# Patient Record
Sex: MALE | Race: OTHER | HISPANIC OR LATINO | URBAN - METROPOLITAN AREA
[De-identification: names, ages, dates, MRNs, and addresses within clinical notes are randomized per-mention and may not be internally consistent; named-entity substitution may affect disease eponyms.]

---

## 2018-10-04 ENCOUNTER — OUTPATIENT (OUTPATIENT)
Dept: OUTPATIENT SERVICES | Facility: HOSPITAL | Age: 73
LOS: 1 days | Discharge: HOME | End: 2018-10-04
Payer: MEDICARE

## 2018-10-04 LAB
BASE EXCESS BLDV CALC-SCNC: 3.7 MMOL/L — SIGNIFICANT CHANGE UP
CA-I SERPL-SCNC: 1.04 MMOL/L — LOW (ref 1.12–1.3)
GAS PNL BLDV: 138 MMOL/L — SIGNIFICANT CHANGE UP (ref 138–146)
GAS PNL BLDV: SIGNIFICANT CHANGE UP
GAS PNL BLDV: SIGNIFICANT CHANGE UP
GLUCOSE BLDC GLUCOMTR-MCNC: 92 MG/DL — SIGNIFICANT CHANGE UP (ref 70–99)
HCO3 BLDV-SCNC: 28 MMOL/L — HIGH (ref 20–27)
PCO2 BLDV: 43 MMHG — SIGNIFICANT CHANGE UP (ref 41–51)
PH BLDV: 7.44 — HIGH (ref 7.32–7.43)
PO2 BLDV: 48 MMHG — SIGNIFICANT CHANGE UP
POTASSIUM BLDV-SCNC: 5.2 MMOL/L — HIGH (ref 3.5–4.9)
SAO2 % BLDV: 82 % — SIGNIFICANT CHANGE UP

## 2018-10-04 PROCEDURE — 45385 COLONOSCOPY W/LESION REMOVAL: CPT

## 2018-10-04 PROCEDURE — 82803 BLOOD GASES ANY COMBINATION: CPT

## 2018-10-04 PROCEDURE — 82330 ASSAY OF CALCIUM: CPT

## 2018-10-04 PROCEDURE — 88305 TISSUE EXAM BY PATHOLOGIST: CPT

## 2018-10-04 PROCEDURE — 82962 GLUCOSE BLOOD TEST: CPT

## 2018-10-04 PROCEDURE — 84295 ASSAY OF SERUM SODIUM: CPT

## 2018-10-04 PROCEDURE — 84132 ASSAY OF SERUM POTASSIUM: CPT

## 2018-10-04 PROCEDURE — 45384 COLONOSCOPY W/LESION REMOVAL: CPT | Mod: XS

## 2018-10-04 RX ORDER — SODIUM POLYSTYRENE SULFONATE 4.1 MEQ/G
30 POWDER, FOR SUSPENSION ORAL ONCE
Qty: 0 | Refills: 0 | Status: DISCONTINUED | OUTPATIENT
Start: 2018-10-04 | End: 2018-10-05

## 2018-10-05 LAB — SURGICAL PATHOLOGY STUDY: SIGNIFICANT CHANGE UP

## 2019-08-19 ENCOUNTER — INPATIENT (INPATIENT)
Facility: HOSPITAL | Age: 74
LOS: 1 days | Discharge: HOME | DRG: 919 | End: 2019-08-21
Attending: SURGERY | Admitting: SURGERY
Payer: MEDICARE

## 2019-08-19 VITALS
DIASTOLIC BLOOD PRESSURE: 54 MMHG | WEIGHT: 160.06 LBS | SYSTOLIC BLOOD PRESSURE: 111 MMHG | HEIGHT: 67 IN | RESPIRATION RATE: 18 BRPM | OXYGEN SATURATION: 100 % | HEART RATE: 60 BPM

## 2019-08-19 DIAGNOSIS — T14.8XXA OTHER INJURY OF UNSPECIFIED BODY REGION, INITIAL ENCOUNTER: ICD-10-CM

## 2019-08-19 LAB
ALBUMIN SERPL ELPH-MCNC: 3.5 G/DL — SIGNIFICANT CHANGE UP (ref 3.3–5)
ALP SERPL-CCNC: 43 U/L — SIGNIFICANT CHANGE UP (ref 40–120)
ALT FLD-CCNC: 14 U/L — SIGNIFICANT CHANGE UP (ref 10–45)
ANION GAP SERPL CALC-SCNC: 17 MMOL/L — SIGNIFICANT CHANGE UP (ref 5–17)
APTT BLD: 30.3 SEC — SIGNIFICANT CHANGE UP (ref 27.5–36.3)
AST SERPL-CCNC: 16 U/L — SIGNIFICANT CHANGE UP (ref 10–40)
BASE EXCESS BLDV CALC-SCNC: 2.6 MMOL/L — HIGH (ref -2–2)
BILIRUB SERPL-MCNC: 0.3 MG/DL — SIGNIFICANT CHANGE UP (ref 0.2–1.2)
BLD GP AB SCN SERPL QL: NEGATIVE — SIGNIFICANT CHANGE UP
BUN SERPL-MCNC: 42 MG/DL — HIGH (ref 7–23)
CA-I SERPL-SCNC: 1.11 MMOL/L — LOW (ref 1.12–1.3)
CALCIUM SERPL-MCNC: 9.4 MG/DL — SIGNIFICANT CHANGE UP (ref 8.4–10.5)
CHLORIDE BLDV-SCNC: 106 MMOL/L — SIGNIFICANT CHANGE UP (ref 96–108)
CHLORIDE SERPL-SCNC: 102 MMOL/L — SIGNIFICANT CHANGE UP (ref 96–108)
CO2 BLDV-SCNC: 27 MMOL/L — SIGNIFICANT CHANGE UP (ref 22–30)
CO2 SERPL-SCNC: 23 MMOL/L — SIGNIFICANT CHANGE UP (ref 22–31)
CREAT SERPL-MCNC: 10.49 MG/DL — HIGH (ref 0.5–1.3)
GAS PNL BLDV: 139 MMOL/L — SIGNIFICANT CHANGE UP (ref 135–145)
GAS PNL BLDV: SIGNIFICANT CHANGE UP
GAS PNL BLDV: SIGNIFICANT CHANGE UP
GLUCOSE BLDV-MCNC: 139 MG/DL — HIGH (ref 70–99)
GLUCOSE SERPL-MCNC: 145 MG/DL — HIGH (ref 70–99)
HCO3 BLDV-SCNC: 26 MMOL/L — SIGNIFICANT CHANGE UP (ref 21–29)
HCT VFR BLD CALC: 24.4 % — LOW (ref 39–50)
HCT VFR BLDA CALC: 24 % — LOW (ref 39–50)
HGB BLD CALC-MCNC: 7.9 G/DL — LOW (ref 13–17)
HGB BLD-MCNC: 8.2 G/DL — LOW (ref 13–17)
INR BLD: 1.28 RATIO — HIGH (ref 0.88–1.16)
LACTATE BLDV-MCNC: 2.1 MMOL/L — HIGH (ref 0.7–2)
MCHC RBC-ENTMCNC: 32.4 PG — SIGNIFICANT CHANGE UP (ref 27–34)
MCHC RBC-ENTMCNC: 33.4 GM/DL — SIGNIFICANT CHANGE UP (ref 32–36)
MCV RBC AUTO: 97 FL — SIGNIFICANT CHANGE UP (ref 80–100)
PCO2 BLDV: 37 MMHG — SIGNIFICANT CHANGE UP (ref 35–50)
PH BLDV: 7.46 — HIGH (ref 7.35–7.45)
PLATELET # BLD AUTO: 144 K/UL — LOW (ref 150–400)
PO2 BLDV: 43 MMHG — SIGNIFICANT CHANGE UP (ref 25–45)
POTASSIUM BLDV-SCNC: 6.2 MMOL/L — CRITICAL HIGH (ref 3.5–5.3)
POTASSIUM SERPL-MCNC: 5.1 MMOL/L — SIGNIFICANT CHANGE UP (ref 3.5–5.3)
POTASSIUM SERPL-SCNC: 5.1 MMOL/L — SIGNIFICANT CHANGE UP (ref 3.5–5.3)
PROT SERPL-MCNC: 5.9 G/DL — LOW (ref 6–8.3)
PROTHROM AB SERPL-ACNC: 14.7 SEC — HIGH (ref 10–12.9)
RBC # BLD: 2.52 M/UL — LOW (ref 4.2–5.8)
RBC # FLD: 13.8 % — SIGNIFICANT CHANGE UP (ref 10.3–14.5)
RH IG SCN BLD-IMP: POSITIVE — SIGNIFICANT CHANGE UP
SAO2 % BLDV: 80 % — SIGNIFICANT CHANGE UP (ref 67–88)
SODIUM SERPL-SCNC: 142 MMOL/L — SIGNIFICANT CHANGE UP (ref 135–145)
WBC # BLD: 7.7 K/UL — SIGNIFICANT CHANGE UP (ref 3.8–10.5)
WBC # FLD AUTO: 7.7 K/UL — SIGNIFICANT CHANGE UP (ref 3.8–10.5)

## 2019-08-19 RX ORDER — DEXTROSE 50 % IN WATER 50 %
25 SYRINGE (ML) INTRAVENOUS ONCE
Refills: 0 | Status: DISCONTINUED | OUTPATIENT
Start: 2019-08-19 | End: 2019-08-20

## 2019-08-19 RX ORDER — INSULIN LISPRO 100/ML
VIAL (ML) SUBCUTANEOUS EVERY 6 HOURS
Refills: 0 | Status: DISCONTINUED | OUTPATIENT
Start: 2019-08-19 | End: 2019-08-20

## 2019-08-19 RX ORDER — INSULIN LISPRO 100/ML
VIAL (ML) SUBCUTANEOUS
Refills: 0 | Status: DISCONTINUED | OUTPATIENT
Start: 2019-08-19 | End: 2019-08-19

## 2019-08-19 RX ORDER — SODIUM CHLORIDE 9 MG/ML
1000 INJECTION, SOLUTION INTRAVENOUS
Refills: 0 | Status: DISCONTINUED | OUTPATIENT
Start: 2019-08-19 | End: 2019-08-21

## 2019-08-19 RX ORDER — DEXTROSE 50 % IN WATER 50 %
12.5 SYRINGE (ML) INTRAVENOUS ONCE
Refills: 0 | Status: DISCONTINUED | OUTPATIENT
Start: 2019-08-19 | End: 2019-08-21

## 2019-08-19 RX ORDER — INSULIN LISPRO 100/ML
VIAL (ML) SUBCUTANEOUS AT BEDTIME
Refills: 0 | Status: DISCONTINUED | OUTPATIENT
Start: 2019-08-19 | End: 2019-08-21

## 2019-08-19 RX ORDER — ASPIRIN/CALCIUM CARB/MAGNESIUM 324 MG
81 TABLET ORAL DAILY
Refills: 0 | Status: DISCONTINUED | OUTPATIENT
Start: 2019-08-19 | End: 2019-08-20

## 2019-08-19 RX ORDER — HEPARIN SODIUM 5000 [USP'U]/ML
5000 INJECTION INTRAVENOUS; SUBCUTANEOUS EVERY 8 HOURS
Refills: 0 | Status: DISCONTINUED | OUTPATIENT
Start: 2019-08-19 | End: 2019-08-20

## 2019-08-19 RX ORDER — GLUCAGON INJECTION, SOLUTION 0.5 MG/.1ML
1 INJECTION, SOLUTION SUBCUTANEOUS ONCE
Refills: 0 | Status: DISCONTINUED | OUTPATIENT
Start: 2019-08-19 | End: 2019-08-21

## 2019-08-19 RX ORDER — SODIUM CHLORIDE 9 MG/ML
1000 INJECTION INTRAMUSCULAR; INTRAVENOUS; SUBCUTANEOUS
Refills: 0 | Status: DISCONTINUED | OUTPATIENT
Start: 2019-08-19 | End: 2019-08-20

## 2019-08-19 RX ORDER — DEXTROSE 50 % IN WATER 50 %
25 SYRINGE (ML) INTRAVENOUS ONCE
Refills: 0 | Status: DISCONTINUED | OUTPATIENT
Start: 2019-08-19 | End: 2019-08-21

## 2019-08-19 RX ORDER — DEXTROSE 50 % IN WATER 50 %
15 SYRINGE (ML) INTRAVENOUS ONCE
Refills: 0 | Status: DISCONTINUED | OUTPATIENT
Start: 2019-08-19 | End: 2019-08-21

## 2019-08-19 NOTE — ED PROVIDER NOTE - CLINICAL SUMMARY MEDICAL DECISION MAKING FREE TEXT BOX
74 yo m pw left groin swelling s/p IR artherectomy. pt VSS. vasc consult. imaging. reassess. 72 yo m pw left groin swelling s/p IR artherectomy. pt VSS. vasc consult. imaging. reassess.  Attending Sanjuanitamariza Camara: 74 y/o male s/p recent vascular procedure presenting with increased hematoma and swelling to scrotum. upon arrival vascular surgery consulted and pressure being applied to femoral artery. not reportedly on blood thinners. d/w vascular who recommends CTA and continued pressure to evaluate for active extravasation. tba

## 2019-08-19 NOTE — H&P ADULT - ASSESSMENT
73M with PMH of CAD with stents (2013, on ASA, plavix), DM, HLD, ESRD on HD now with groin hematoma s/p diagnostic angiogram at outside facility.  CTA performed, no active contrast extravasation or retroperitoneal hematoma.    -Admit to vascular surgery (Rayna)  - NPO, NS @ 30 cc/hr  - Femstop device applied at 65mmHg- will remove after 2 hours  - Trend serial CBC q 6 hours, serial groin exam   - Continue ASA, hold plavix  - ISS for DM  - HD tomorrow, will consult renal in AM    D/W Dr. Martinez, vascular fellow

## 2019-08-19 NOTE — ED ADULT NURSE NOTE - NSIMPLEMENTINTERV_GEN_ALL_ED
Implemented All Fall with Harm Risk Interventions:  [place] to call system. Call bell, personal items and telephone within reach. Instruct patient to call for assistance. Room bathroom lighting operational. Non-slip footwear when patient is off stretcher. Physically safe environment: no spills, clutter or unnecessary equipment. Stretcher in lowest position, wheels locked, appropriate side rails in place. Provide visual cue, wrist band, yellow gown, etc. Monitor gait and stability. Monitor for mental status changes and reorient to person, place, and time. Review medications for side effects contributing to fall risk. Reinforce activity limits and safety measures with patient and family. Provide visual clues: red socks.

## 2019-08-19 NOTE — ED PROVIDER NOTE - OBJECTIVE STATEMENT
73M with PMH of CAD s/p stents on ASA, plavix, ESRD on HD tu/thurs/sat via LUE AVF, DM, HLD presents after outpatient IR angiogram with left groin hematoma.   Immediately post procedure the patient developed a large left groin hematoma over the access site that tracked into his scrotum.  Pressure was applied and it appeared to stop however four hours later the patient developed an expanding hematoma. Pt reports pain to area of site, mild, dull, 3/10. denies n/v, f/c, cp, sob.

## 2019-08-19 NOTE — ED PROVIDER NOTE - NS ED ROS FT
GENERAL: No fever or chills, //             EYES: no change in vision, //             HEENT: no trouble swallowing or speaking, //             CARDIAC: no chest pain, //              PULMONARY: no cough or SOB, //             GI: no abdominal pain, no nausea or no vomiting, no diarrhea or constipation, //             : No changes in urination,  //            SKIN: no rashes,  //            NEURO: no headache,  //             MSK: r groin pain. ~Reginaldo Anguiano DO PGY1

## 2019-08-19 NOTE — ED ADULT NURSE NOTE - EXTENSIONS OF SELF_ADULT
Returned patient's phone call, he states he is in the process of moving to [place] and needed a 90 script for Pantoprazole.  He plans to find a GI MD once he moves there. Advised will e-scribe script to his mail order pharmacy. He verb understanding.    None

## 2019-08-19 NOTE — ED PROVIDER NOTE - PHYSICAL EXAMINATION
General: well appearing, interactive, well nourished, no apparent distress  HEENT: EOMI, PERRLA, normal mucosa, normal oropharynx, no lesions on the lips or on oral mucosa, normal external ear  Neck: supple, no lymphadenopathy, full range of motion, no nuchal rigidity  CV: RRR, normal S1 and S2 with no murmur, capillary refill less than two seconds, pp 2+  Resp: lungs CTA b/l, good aeration bilaterally, symmetric chest wall   Abd: non-distended, soft, non-tender  : no CVA tenderness  MSK: full range of motion, left groin swelling extending into scrotum, no evidence of paraphimosis ( chaperone: trino muñoz md)  Neuro: cranial nerves intact, muscle strength 5/5 in all extremities, normal gait  Skin: no rashes, skin intact General: well appearing, interactive, well nourished, no apparent distress  HEENT: EOMI, PERRLA, normal mucosa, normal oropharynx, no lesions on the lips or on oral mucosa, normal external ear  Neck: supple, no lymphadenopathy, full range of motion, no nuchal rigidity  CV: RRR, normal S1 and S2 with no murmur, capillary refill less than two seconds, pp 2+  Resp: lungs CTA b/l, good aeration bilaterally, symmetric chest wall   Abd: non-distended, soft, non-tender  : no CVA tenderness  MSK: full range of motion, left groin swelling extending into scrotum, no evidence of paraphimosis ( chaperone: sanjuanita camara md)  Neuro: cranial nerves intact, muscle strength 5/5 in all extremities, normal gait  Skin: no rashes, skin intact  Attending Sanjuanita Camara: Gen: NAD, heent: atrauamtic, eomi, perrla, mmm, op pink, uvula midline, neck; nttp, no nuchal rigidity, chest: nttp, no crepitus, cv: rrr, , lungs: ctab, abd: soft, nontender, nondistended, no peritoneal signs, , ext: wwp, neg homans, : diffuse scrotal swelling, and mild ttp, skin: no rash, neuro: awake and alert, following commands, speech clear, sensation and strength intact, no focal deficits

## 2019-08-19 NOTE — H&P ADULT - NSICDXPASTMEDICALHX_GEN_ALL_CORE_FT
PAST MEDICAL HISTORY:  CAD (coronary artery disease)     Diabetes mellitus     End-stage renal disease     Hyperlipidemia cad

## 2019-08-19 NOTE — ED PROVIDER NOTE - ATTENDING CONTRIBUTION TO CARE
Attending MD Sanjuanita Camara:  I personally have seen and examined this patient.  Resident note reviewed and agree on plan of care and except where noted.  See HPI, PE, and MDM for details.

## 2019-08-19 NOTE — ED ADULT NURSE NOTE - NSFALLRSKHRMRISKTYPE_ED_ALL_ED
surgery(72hr postop, recent leg amputee, sig. abd/thor surg)/coagulation(Bleeding disorder R/T clinical cond/anti-coags)

## 2019-08-19 NOTE — H&P ADULT - HISTORY OF PRESENT ILLNESS
Vascular Surgery  Attending: Rayna  Service: Vascular   Contact: [phone]    HPI  73M with PMH of CAD s/p stents on ASA, plavix, ESRD on HD tu/thurs/sat via LUE AVF, DM, HLD presents after outpatient IR angiogram with left groin hematoma.  Patient was undergoing a diagnostic angiogram at [facility] with Dr. Watson.  A 7Fr sheath was placed in the L femoral artery.  A closure device was not used as patient was hypertensive at the time.  Immediately post procedure the patient developed a large left groin hematoma over the access site that tracked into his scrotum.  Pressure was applied and it appeared to stop however four hours later the patient developed an expanding hematoma.  He was emergently brought to [facility] for vascular surgery evaluation.      In the ER he was hemodyanmically stable with a HR in the 70s-80s, -160, saturating well on RA.  On exam he had a large L groin hematoma with firm pubis and swollen scrotum.  He had palpable femoral pulses, dopplerable PT bilaterally and nonpalpable bilateral DP.  Dopplerable R AT present.  Hct 24 , lactate 2.1.  CTA of abdomen and pelvis revealed no active extravasation or retroperitoneal hematoma.      PMH/PSH  End-stage renal disease  Diabetes mellitus  CAD (coronary artery disease)  Hyperlipidemia          Home Medications:  aspirin 81 mg oral tablet, chewable: 1 tab(s) orally once a day (19 Aug 2019 21:53)  glipiZIDE 10 mg oral tablet: 1 tab(s) orally once a day (19 Aug 2019 21:53)  Plavix 75 mg oral tablet: 1 tab(s) orally once a day (19 Aug 2019 21:53)      Allergies    No Known Allergies    Intolerances        Social: lives at home with wife     Physical Exam  T(C): 36.9 (08-19-19 @ 20:16), Max: 36.9 (08-19-19 @ 20:16)  HR: 59 (08-19-19 @ 21:42) (59 - 64)  BP: 131/68 (08-19-19 @ 21:42) (111/54 - 158/61)  RR: 16 (08-19-19 @ 21:42) (16 - 18)  SpO2: 99% (08-19-19 @ 21:42) (97% - 100%)  Wt(kg): --  Tmax: T(C): , Max: 36.9 (08-19-19 @ 20:16)  Wt(kg): --      Gen: NAD  Neuro: AAOx3  HEENT: normocephalic, atraumatic, no scleral icterus  CV: S1, S2, RRR  Pulm: CTA B/L  Abd: firm over pubis with swollen scrotum, soft anterior thigh compartment, abdomen soft, non tender.   Ext: warm, no edema, palpable femoral pulses, dopplerable PT bilaterally and nonpalpable bilateral DP.  Dopplerable R AT present    LABS                        8.2    7.7   )-----------( 144      ( 19 Aug 2019 19:52 )             24.4     08-19    142  |  102  |  42<H>  ----------------------------<  145<H>  5.1   |  23  |  10.49<H>    Ca    9.4      19 Aug 2019 19:52    TPro  5.9<L>  /  Alb  3.5  /  TBili  0.3  /  DBili  x   /  AST  16  /  ALT  14  /  AlkPhos  43  08-19    PT/INR - ( 19 Aug 2019 20:03 )   PT: 14.7 sec;   INR: 1.28 ratio         PTT - ( 19 Aug 2019 20:03 )  PTT:30.3 sec      CTA reviewed with radiology.

## 2019-08-19 NOTE — ED PROVIDER NOTE - PROGRESS NOTE DETAILS
Attending Sanjuanita Camara: d/w vascular surgery upon arrival request cta of abdomen and ext Attending Sanjuanita Camara: d/w vascular will admit Attending Sanjuanita Camara: d/w vascular will admit, fem stop pressure applied

## 2019-08-19 NOTE — ED ADULT NURSE NOTE - OBJECTIVE STATEMENT
bleeding from groin  sp procedure  coming from md office  on blood thinners 74 yo m brought in by EMS for "bleeding from groin s/p procedure". PMH of ESRD (on dialysis w/ fistula to Left UE (pink "do not use" band applied to LUE), DM, CAD, & HLD. Pt had atherectomy today in MD office and developed bleeding from left groin and swelling in the left testicle. Pressure applied en route to hospital. No bleeding noted from groin at this time.      s/p Atherectomy. bleeding from groin  sp procedure  coming from md office  on blood thinners 74 yo m brought in by EMS for "bleeding from groin s/p procedure". PMH of ESRD (on dialysis w/ fistula to Left UE (pink "do not use" band applied to LUE), DM, CAD (s/p stents on ASA & Plavix), & HLD. Pt had IR angiogram procedure done today in MD office with access done in Left groin. Immediately post-procedure, pt developed large hematoma to left groin and swelling in the left testicle. Pressure was applied by staff at [facility] and an expanding hematoma to left groin was noted 4 hours post-procedure. Pt sent here for vascular surgery evaluation. Pressure applied en route to hospital by [facility] Staff. No bleeding noted from groin at this time. Pt AAOx3, NAD, Respirations spontaneous, non-labored. Lungs clear bilaterally. Abdomen soft nontender nondistended. Full range of motion of all extremities. Bilateral radial pulses noted. Bilateral femoral pulses present. Non-palpable dorsalis pedis pulses (pt reports to be baseline). Large hematoma noted to left groin & swelling noted to the scrotum. Pt denies headache, dizziness, chest pain, palpitations, cough, SOB, abdominal pain, n/v/d, numbness/tingling, fevers, chills, weakness at this time. VSS. 22 gauge in RAC placed prior to arrival. 18 gauge established in the and. Bed locked & in lowest position. Safety maintained. Will continue to reassess.

## 2019-08-19 NOTE — ED ADULT TRIAGE NOTE - CHIEF COMPLAINT QUOTE
bleeding from groin  sp procedure  coming from md office bleeding from groin  sp procedure  coming from md office  on blood thinners

## 2019-08-20 DIAGNOSIS — I10 ESSENTIAL (PRIMARY) HYPERTENSION: ICD-10-CM

## 2019-08-20 DIAGNOSIS — N18.6 END STAGE RENAL DISEASE: ICD-10-CM

## 2019-08-20 DIAGNOSIS — D64.9 ANEMIA, UNSPECIFIED: ICD-10-CM

## 2019-08-20 LAB
BLD GP AB SCN SERPL QL: NEGATIVE — SIGNIFICANT CHANGE UP
GLUCOSE BLDC GLUCOMTR-MCNC: 123 MG/DL — HIGH (ref 70–99)
GLUCOSE BLDC GLUCOMTR-MCNC: 133 MG/DL — HIGH (ref 70–99)
GLUCOSE BLDC GLUCOMTR-MCNC: 142 MG/DL — HIGH (ref 70–99)
GLUCOSE BLDC GLUCOMTR-MCNC: 146 MG/DL — HIGH (ref 70–99)
GLUCOSE BLDC GLUCOMTR-MCNC: 156 MG/DL — HIGH (ref 70–99)
GLUCOSE BLDC GLUCOMTR-MCNC: 67 MG/DL — LOW (ref 70–99)
GLUCOSE BLDC GLUCOMTR-MCNC: 68 MG/DL — LOW (ref 70–99)
GLUCOSE BLDC GLUCOMTR-MCNC: 91 MG/DL — SIGNIFICANT CHANGE UP (ref 70–99)
HBA1C BLD-MCNC: 5 % — SIGNIFICANT CHANGE UP (ref 4–5.6)
HCT VFR BLD CALC: 20.8 % — CRITICAL LOW (ref 39–50)
HCT VFR BLD CALC: 24.3 % — LOW (ref 39–50)
HCT VFR BLD CALC: 27.5 % — LOW (ref 39–50)
HCV AB S/CO SERPL IA: 0.09 S/CO — SIGNIFICANT CHANGE UP (ref 0–0.99)
HCV AB SERPL-IMP: SIGNIFICANT CHANGE UP
HGB BLD-MCNC: 6.8 G/DL — CRITICAL LOW (ref 13–17)
HGB BLD-MCNC: 8 G/DL — LOW (ref 13–17)
HGB BLD-MCNC: 9.6 G/DL — LOW (ref 13–17)
LACTATE SERPL-SCNC: 1.1 MMOL/L — SIGNIFICANT CHANGE UP (ref 0.7–2)
MCHC RBC-ENTMCNC: 32.2 PG — SIGNIFICANT CHANGE UP (ref 27–34)
MCHC RBC-ENTMCNC: 32.2 PG — SIGNIFICANT CHANGE UP (ref 27–34)
MCHC RBC-ENTMCNC: 32.6 GM/DL — SIGNIFICANT CHANGE UP (ref 32–36)
MCHC RBC-ENTMCNC: 32.6 PG — SIGNIFICANT CHANGE UP (ref 27–34)
MCHC RBC-ENTMCNC: 32.9 GM/DL — SIGNIFICANT CHANGE UP (ref 32–36)
MCHC RBC-ENTMCNC: 34.8 GM/DL — SIGNIFICANT CHANGE UP (ref 32–36)
MCV RBC AUTO: 93.7 FL — SIGNIFICANT CHANGE UP (ref 80–100)
MCV RBC AUTO: 98 FL — SIGNIFICANT CHANGE UP (ref 80–100)
MCV RBC AUTO: 98.6 FL — SIGNIFICANT CHANGE UP (ref 80–100)
PLATELET # BLD AUTO: 109 K/UL — LOW (ref 150–400)
PLATELET # BLD AUTO: 121 K/UL — LOW (ref 150–400)
PLATELET # BLD AUTO: 126 K/UL — LOW (ref 150–400)
RBC # BLD: 2.11 M/UL — LOW (ref 4.2–5.8)
RBC # BLD: 2.48 M/UL — LOW (ref 4.2–5.8)
RBC # BLD: 2.94 M/UL — LOW (ref 4.2–5.8)
RBC # FLD: 13.7 % — SIGNIFICANT CHANGE UP (ref 10.3–14.5)
RBC # FLD: 13.7 % — SIGNIFICANT CHANGE UP (ref 10.3–14.5)
RBC # FLD: 14.4 % — SIGNIFICANT CHANGE UP (ref 10.3–14.5)
RH IG SCN BLD-IMP: POSITIVE — SIGNIFICANT CHANGE UP
WBC # BLD: 5.2 K/UL — SIGNIFICANT CHANGE UP (ref 3.8–10.5)
WBC # BLD: 5.9 K/UL — SIGNIFICANT CHANGE UP (ref 3.8–10.5)
WBC # BLD: 7.1 K/UL — SIGNIFICANT CHANGE UP (ref 3.8–10.5)
WBC # FLD AUTO: 5.2 K/UL — SIGNIFICANT CHANGE UP (ref 3.8–10.5)
WBC # FLD AUTO: 5.9 K/UL — SIGNIFICANT CHANGE UP (ref 3.8–10.5)
WBC # FLD AUTO: 7.1 K/UL — SIGNIFICANT CHANGE UP (ref 3.8–10.5)

## 2019-08-20 RX ORDER — CLOPIDOGREL BISULFATE 75 MG/1
1 TABLET, FILM COATED ORAL
Qty: 0 | Refills: 0 | DISCHARGE

## 2019-08-20 RX ORDER — HEPARIN SODIUM 5000 [USP'U]/ML
5000 INJECTION INTRAVENOUS; SUBCUTANEOUS EVERY 8 HOURS
Refills: 0 | Status: DISCONTINUED | OUTPATIENT
Start: 2019-08-20 | End: 2019-08-21

## 2019-08-20 RX ORDER — ATORVASTATIN CALCIUM 80 MG/1
80 TABLET, FILM COATED ORAL AT BEDTIME
Refills: 0 | Status: DISCONTINUED | OUTPATIENT
Start: 2019-08-20 | End: 2019-08-21

## 2019-08-20 RX ORDER — ASPIRIN/CALCIUM CARB/MAGNESIUM 324 MG
81 TABLET ORAL DAILY
Refills: 0 | Status: DISCONTINUED | OUTPATIENT
Start: 2019-08-20 | End: 2019-08-21

## 2019-08-20 RX ORDER — ROPINIROLE 8 MG/1
0.25 TABLET, FILM COATED, EXTENDED RELEASE ORAL AT BEDTIME
Refills: 0 | Status: DISCONTINUED | OUTPATIENT
Start: 2019-08-20 | End: 2019-08-20

## 2019-08-20 RX ORDER — ATORVASTATIN CALCIUM 80 MG/1
1 TABLET, FILM COATED ORAL
Qty: 0 | Refills: 0 | DISCHARGE

## 2019-08-20 RX ORDER — ACETAMINOPHEN 500 MG
1000 TABLET ORAL ONCE
Refills: 0 | Status: COMPLETED | OUTPATIENT
Start: 2019-08-20 | End: 2019-08-20

## 2019-08-20 RX ORDER — HYDRALAZINE HCL 50 MG
25 TABLET ORAL THREE TIMES A DAY
Refills: 0 | Status: DISCONTINUED | OUTPATIENT
Start: 2019-08-20 | End: 2019-08-20

## 2019-08-20 RX ORDER — CHLORHEXIDINE GLUCONATE 213 G/1000ML
1 SOLUTION TOPICAL DAILY
Refills: 0 | Status: DISCONTINUED | OUTPATIENT
Start: 2019-08-20 | End: 2019-08-21

## 2019-08-20 RX ORDER — TAMSULOSIN HYDROCHLORIDE 0.4 MG/1
1 CAPSULE ORAL
Qty: 0 | Refills: 0 | DISCHARGE

## 2019-08-20 RX ORDER — DEXTROSE 50 % IN WATER 50 %
12.5 SYRINGE (ML) INTRAVENOUS ONCE
Refills: 0 | Status: COMPLETED | OUTPATIENT
Start: 2019-08-20 | End: 2019-08-20

## 2019-08-20 RX ORDER — TAMSULOSIN HYDROCHLORIDE 0.4 MG/1
0.4 CAPSULE ORAL AT BEDTIME
Refills: 0 | Status: DISCONTINUED | OUTPATIENT
Start: 2019-08-20 | End: 2019-08-21

## 2019-08-20 RX ORDER — HYDRALAZINE HCL 50 MG
10 TABLET ORAL ONCE
Refills: 0 | Status: COMPLETED | OUTPATIENT
Start: 2019-08-20 | End: 2019-08-20

## 2019-08-20 RX ORDER — INSULIN LISPRO 100/ML
VIAL (ML) SUBCUTANEOUS
Refills: 0 | Status: DISCONTINUED | OUTPATIENT
Start: 2019-08-20 | End: 2019-08-21

## 2019-08-20 RX ORDER — ERYTHROPOIETIN 10000 [IU]/ML
8000 INJECTION, SOLUTION INTRAVENOUS; SUBCUTANEOUS
Refills: 0 | Status: DISCONTINUED | OUTPATIENT
Start: 2019-08-20 | End: 2019-08-21

## 2019-08-20 RX ORDER — HYDRALAZINE HCL 50 MG
25 TABLET ORAL THREE TIMES A DAY
Refills: 0 | Status: DISCONTINUED | OUTPATIENT
Start: 2019-08-20 | End: 2019-08-21

## 2019-08-20 RX ORDER — NIFEDIPINE 30 MG
60 TABLET, EXTENDED RELEASE 24 HR ORAL DAILY
Refills: 0 | Status: DISCONTINUED | OUTPATIENT
Start: 2019-08-20 | End: 2019-08-21

## 2019-08-20 RX ORDER — NIFEDIPINE 30 MG
60 TABLET, EXTENDED RELEASE 24 HR ORAL DAILY
Refills: 0 | Status: DISCONTINUED | OUTPATIENT
Start: 2019-08-20 | End: 2019-08-20

## 2019-08-20 RX ADMIN — Medication 25 MILLIGRAM(S): at 15:16

## 2019-08-20 RX ADMIN — ATORVASTATIN CALCIUM 80 MILLIGRAM(S): 80 TABLET, FILM COATED ORAL at 21:51

## 2019-08-20 RX ADMIN — CHLORHEXIDINE GLUCONATE 1 APPLICATION(S): 213 SOLUTION TOPICAL at 17:00

## 2019-08-20 RX ADMIN — Medication 400 MILLIGRAM(S): at 01:19

## 2019-08-20 RX ADMIN — Medication 10 MILLIGRAM(S): at 16:46

## 2019-08-20 RX ADMIN — ERYTHROPOIETIN 8000 UNIT(S): 10000 INJECTION, SOLUTION INTRAVENOUS; SUBCUTANEOUS at 12:53

## 2019-08-20 RX ADMIN — Medication 25 MILLIGRAM(S): at 21:51

## 2019-08-20 RX ADMIN — HEPARIN SODIUM 5000 UNIT(S): 5000 INJECTION INTRAVENOUS; SUBCUTANEOUS at 15:16

## 2019-08-20 RX ADMIN — HEPARIN SODIUM 5000 UNIT(S): 5000 INJECTION INTRAVENOUS; SUBCUTANEOUS at 06:02

## 2019-08-20 RX ADMIN — Medication 12.5 GRAM(S): at 05:59

## 2019-08-20 RX ADMIN — Medication 1: at 09:01

## 2019-08-20 RX ADMIN — HEPARIN SODIUM 5000 UNIT(S): 5000 INJECTION INTRAVENOUS; SUBCUTANEOUS at 21:51

## 2019-08-20 RX ADMIN — TAMSULOSIN HYDROCHLORIDE 0.4 MILLIGRAM(S): 0.4 CAPSULE ORAL at 21:51

## 2019-08-20 RX ADMIN — Medication 81 MILLIGRAM(S): at 15:16

## 2019-08-20 RX ADMIN — Medication 1000 MILLIGRAM(S): at 01:35

## 2019-08-20 NOTE — PROGRESS NOTE ADULT - SUBJECTIVE AND OBJECTIVE BOX
VASCULAR SURGERY DAILY PROGRESS NOTE:       SUBJECTIVE/ROS: Patient feels well- mild groin pain  Denies nausea, vomiting, chest pain, shortness of breath         MEDICATIONS  (STANDING):  aspirin  chewable 81 milliGRAM(s) Oral daily  dextrose 5%. 1000 milliLiter(s) (50 mL/Hr) IV Continuous <Continuous>  dextrose 50% Injectable 12.5 Gram(s) IV Push once  dextrose 50% Injectable 25 Gram(s) IV Push once  dextrose 50% Injectable 25 Gram(s) IV Push once  heparin  Injectable 5000 Unit(s) SubCutaneous every 8 hours  insulin lispro (HumaLOG) corrective regimen sliding scale   SubCutaneous at bedtime  insulin lispro (HumaLOG) corrective regimen sliding scale   SubCutaneous every 6 hours  sodium chloride 0.9%. 1000 milliLiter(s) (30 mL/Hr) IV Continuous <Continuous>    MEDICATIONS  (PRN):  dextrose 40% Gel 15 Gram(s) Oral once PRN Blood Glucose LESS THAN 70 milliGRAM(s)/deciliter  glucagon  Injectable 1 milliGRAM(s) IntraMuscular once PRN Glucose LESS THAN 70 milligrams/deciliter      OBJECTIVE:    Vital Signs Last 24 Hrs  T(C): 37 (20 Aug 2019 05:08), Max: 37.2 (19 Aug 2019 23:58)  T(F): 98.6 (20 Aug 2019 05:08), Max: 98.9 (19 Aug 2019 23:58)  HR: 75 (20 Aug 2019 05:08) (59 - 80)  BP: 181/72 (20 Aug 2019 05:08) (111/54 - 181/72)  BP(mean): --  RR: 16 (20 Aug 2019 05:08) (16 - 18)  SpO2: 97% (20 Aug 2019 05:08) (97% - 100%)        I&O's Detail    19 Aug 2019 07:01  -  20 Aug 2019 06:58  --------------------------------------------------------  IN:    IV PiggyBack: 100 mL    sodium chloride 0.9%.: 240 mL  Total IN: 340 mL    OUT:  Total OUT: 0 mL    Total NET: 340 mL          Daily Height in cm: 170.18 (19 Aug 2019 19:36)    Daily     LABS:                        8.0    7.1   )-----------( 126      ( 20 Aug 2019 00:05 )             24.3     08-19    142  |  102  |  42<H>  ----------------------------<  145<H>  5.1   |  23  |  10.49<H>    Ca    9.4      19 Aug 2019 19:52    TPro  5.9<L>  /  Alb  3.5  /  TBili  0.3  /  DBili  x   /  AST  16  /  ALT  14  /  AlkPhos  43  08-19    PT/INR - ( 19 Aug 2019 20:03 )   PT: 14.7 sec;   INR: 1.28 ratio         PTT - ( 19 Aug 2019 20:03 )  PTT:30.3 sec              PHYSICAL EXAM:  Constitutional: well developed, well nourished, NAD  Eyes: anicteric  ENMT: normal facies, symmetric  Neck: supple  Respiratory: CTA bilaterally  Cardiovascular: RRR  Gastrointestinal: abdomen soft, nontender, nondistended. No obvious masses. No peritonitis  Extremities: FROM, warm  Neurological: intact, non-focal  Skin: no gross lesions  Lymph Nodes: no gross adenopathy  Musculoskeletal: equal strength bilateral upper and lower extremities  Psychiatric: oriented x 3; appropriate VASCULAR SURGERY DAILY PROGRESS NOTE:       SUBJECTIVE/ROS: Patient feels well- mild groin/scrotal discomfort   Denies nausea, vomiting, chest pain, shortness of breath         MEDICATIONS  (STANDING):  aspirin  chewable 81 milliGRAM(s) Oral daily  dextrose 5%. 1000 milliLiter(s) (50 mL/Hr) IV Continuous <Continuous>  dextrose 50% Injectable 12.5 Gram(s) IV Push once  dextrose 50% Injectable 25 Gram(s) IV Push once  dextrose 50% Injectable 25 Gram(s) IV Push once  heparin  Injectable 5000 Unit(s) SubCutaneous every 8 hours  insulin lispro (HumaLOG) corrective regimen sliding scale   SubCutaneous at bedtime  insulin lispro (HumaLOG) corrective regimen sliding scale   SubCutaneous every 6 hours  sodium chloride 0.9%. 1000 milliLiter(s) (30 mL/Hr) IV Continuous <Continuous>    MEDICATIONS  (PRN):  dextrose 40% Gel 15 Gram(s) Oral once PRN Blood Glucose LESS THAN 70 milliGRAM(s)/deciliter  glucagon  Injectable 1 milliGRAM(s) IntraMuscular once PRN Glucose LESS THAN 70 milligrams/deciliter      OBJECTIVE:    Vital Signs Last 24 Hrs  T(C): 37 (20 Aug 2019 05:08), Max: 37.2 (19 Aug 2019 23:58)  T(F): 98.6 (20 Aug 2019 05:08), Max: 98.9 (19 Aug 2019 23:58)  HR: 75 (20 Aug 2019 05:08) (59 - 80)  BP: 181/72 (20 Aug 2019 05:08) (111/54 - 181/72)  BP(mean): --  RR: 16 (20 Aug 2019 05:08) (16 - 18)  SpO2: 97% (20 Aug 2019 05:08) (97% - 100%)        I&O's Detail    19 Aug 2019 07:01  -  20 Aug 2019 06:58  --------------------------------------------------------  IN:    IV PiggyBack: 100 mL    sodium chloride 0.9%.: 240 mL  Total IN: 340 mL    OUT:  Total OUT: 0 mL    Total NET: 340 mL          Daily Height in cm: 170.18 (19 Aug 2019 19:36)    Daily     LABS:                        8.0    7.1   )-----------( 126      ( 20 Aug 2019 00:05 )             24.3     08-19    142  |  102  |  42<H>  ----------------------------<  145<H>  5.1   |  23  |  10.49<H>    Ca    9.4      19 Aug 2019 19:52    TPro  5.9<L>  /  Alb  3.5  /  TBili  0.3  /  DBili  x   /  AST  16  /  ALT  14  /  AlkPhos  43  08-19    PT/INR - ( 19 Aug 2019 20:03 )   PT: 14.7 sec;   INR: 1.28 ratio         PTT - ( 19 Aug 2019 20:03 )  PTT:30.3 sec            Gen: NAD  Neuro: AAOx3  HEENT: normocephalic, atraumatic, no scleral icterus  CV: S1, S2, RRR  Pulm: CTA B/L  Abd: firm over pubis with swollen scrotum, soft anterior thigh compartment, abdomen soft, non tender.   Ext: warm, no edema, palpable femoral pulses, dopplerable PT bilaterally and nonpalpable bilateral DP.  Dopplerable R AT

## 2019-08-20 NOTE — PROVIDER CONTACT NOTE (OTHER) - ASSESSMENT
Pt is A&Ox4, VSS aside from BP. Pt reports his normal BP is 180-200 systolic. Pt goes for dialysis T/Th/S and would normally be going today 8/20. Pt reports he does not know what he takes as far as medications go but he does take "something" for his blood pressure. Pt reports he took all his medications yesterday 8/19. Pt denies dizziness, headache, vision changes. Neurovascular status remains intact. Pt reports being hungry and "really wanting to eat". FS 68 repeated at 67. Dextrose 12.5 given per protocol and will reassess in 15 mins

## 2019-08-20 NOTE — CONSULT NOTE ADULT - PROBLEM SELECTOR RECOMMENDATION 9
Pt. with ESRD on HD (Tu/Sat). Last HD as outpatient was on 8/17/19 via AVF. Pt. clinically stable. Serum potassium WNL today. Will arrange for maintenance HD today with PRBC transfusion. Monitor labs.

## 2019-08-20 NOTE — CONSULT NOTE ADULT - PROBLEM SELECTOR RECOMMENDATION 2
Patient with anemia in the setting of ESRD. Hemoglobin below target range. Will continue BUTCH with HD (EPO 8000 2x QW). Plan for 2 U PRBC with HD today. Monitor hemoglobin.

## 2019-08-20 NOTE — CONSULT NOTE ADULT - ASSESSMENT
73-year-old male with PMH of CAD s/p stents on ASA, plavix, ESRD on HD tu/sat via LUE AVF, DM, HLD presents after outpatient IR angiogram with left groin hematoma. Nephrology consulted for ESRD management.
73 M with PMHx of CAD s/p stents on ASA, Plavix, ESRD on HD tu/thurs/sat via LUE AVF, DM/HTN, HLD presents after outpatient IR angiogram with left groin hematoma.  Patient was undergoing a diagnostic angiogram at [facility] with Dr. Watson.  A 7Fr sheath was placed in the L femoral artery.  A closure device was not used as patient was hypertensive at the time.  Immediately post procedure the patient developed a large left groin hematoma over the access site that tracked into his scrotum.  Pressure was applied and it appeared to stop however four hours later the patient developed an expanding hematoma.  He was emergently brought to [facility] for vascular surgery evaluation.      # large L groin hematoma with  swollen scrotum.    # CAD with stents   # ESRD on HD  # DM  # HTN urgency  # HLD    Plan:  Left groin hematoma extending into a large left scrotal hematoma. No active bleeding. no retroperitoneal hematoma.    Pt s/p 2 units PRBC. post transfusion hgb stable. Feeling better.  hgb 6.8 --> 9.8  repeat Hgb in am  vascular surgery eval appreciated  Hemodialysis per renal.  sugars stable, c/w insulin sliding scale  BP high, restart home meds: hydralazine can be increased to 50 mg TID if needed. c/w Nifedipine 60 mg XL. (he hasn't received Nifedipine yet today)  d/w the wife at bedside.   DVT ppx     Thank you for the courtesy of this consult.  Medicine will follow.

## 2019-08-20 NOTE — CONSULT NOTE ADULT - SUBJECTIVE AND OBJECTIVE BOX
[facility] DIVISION OF KIDNEY DISEASES AND HYPERTENSION -- [phone]  -- INITIAL CONSULT NOTE  --------------------------------------------------------------------------------  HPI: 73-year-old male with PMH of CAD s/p stents on ASA, plavix, ESRD on HD tu/sat via LUE AVF, DM, HLD presents after outpatient IR angiogram with left groin hematoma. Nephrology consulted for ESRD management. Pt. with ESRD secondary to HTN/DM, has been on HD since 2016. Pt. receives dialysis at [facility] center and follows with Dra. Almanza. Last HD 8/17/19 completed treatment.     Pt. was seen and examined with wife at bedside, feels well, no  complains.     PAST HISTORY  --------------------------------------------------------------------------------  PAST MEDICAL & SURGICAL HISTORY:  End-stage renal disease  Diabetes mellitus  CAD (coronary artery disease)  Hyperlipidemia: cad    FAMILY HISTORY:  Non contributory    PAST SOCIAL HISTORY:    ALLERGIES & MEDICATIONS  --------------------------------------------------------------------------------  Allergies    No Known Allergies    Intolerances      Standing Inpatient Medications  aspirin  chewable 81 milliGRAM(s) Oral daily  atorvastatin 80 milliGRAM(s) Oral at bedtime  dextrose 5%. 1000 milliLiter(s) IV Continuous <Continuous>  dextrose 50% Injectable 12.5 Gram(s) IV Push once  dextrose 50% Injectable 25 Gram(s) IV Push once  heparin  Injectable 5000 Unit(s) SubCutaneous every 8 hours  hydrALAZINE 25 milliGRAM(s) Oral three times a day  insulin lispro (HumaLOG) corrective regimen sliding scale   SubCutaneous three times a day before meals  insulin lispro (HumaLOG) corrective regimen sliding scale   SubCutaneous at bedtime  NIFEdipine XL 60 milliGRAM(s) Oral daily  tamsulosin 0.4 milliGRAM(s) Oral at bedtime    PRN Inpatient Medications  dextrose 40% Gel 15 Gram(s) Oral once PRN  glucagon  Injectable 1 milliGRAM(s) IntraMuscular once PRN      REVIEW OF SYSTEMS  --------------------------------------------------------------------------------  Gen: No fevers/chills  Skin: No rashes  Head/Eyes/Ears: Normal hearing,   Respiratory: No dyspnea, cough  CV: No chest pain  GI: No abdominal pain, diarrhea  : No dysuria, hematuria  MSK: No  edema  Heme: +left groin hematoma  Psych: No significant depression    All other systems were reviewed and are negative, except as noted.    VITALS/PHYSICAL EXAM  --------------------------------------------------------------------------------  T(C): 37.1 (08-20-19 @ 10:40), Max: 37.3 (08-20-19 @ 09:00)  HR: 80 (08-20-19 @ 10:40) (59 - 80)  BP: 164/68 (08-20-19 @ 10:40) (111/54 - 181/72)  RR: 18 (08-20-19 @ 10:40) (16 - 18)  SpO2: 97% (08-20-19 @ 10:40) (95% - 100%)  Wt(kg): --  Height (cm): 170.18 (08-19-19 @ 19:36)  Weight (kg): 72.6 (08-19-19 @ 19:36)  BMI (kg/m2): 25.1 (08-19-19 @ 19:36)  BSA (m2): 1.84 (08-19-19 @ 19:36)      08-19-19 @ 07:01  -  08-20-19 @ 07:00  --------------------------------------------------------  IN: 340 mL / OUT: 0 mL / NET: 340 mL    08-20-19 @ 07:01  -  08-20-19 @ 11:41  --------------------------------------------------------  IN: 250 mL / OUT: 0 mL / NET: 250 mL      Physical Exam:  	Gen: NAD  	HEENT: MMM  	Pulm: CTA B/L  	CV: S1S2  	Abd: Soft, +BS   	Ext: No LE edema B/L.               : Left groin hematoma with scrotal edema  	Neuro: Awake  	Skin: Warm and dry  	Vascular access: LUE AVF +thrill. bruit heard.     LABS/STUDIES  --------------------------------------------------------------------------------              6.8    5.2   >-----------<  121      [08-20-19 @ 07:16]              20.8     142  |  102  |  42  ----------------------------<  145      [08-19-19 @ 19:52]  5.1   |  23  |  10.49        Ca     9.4     [08-19-19 @ 19:52]    TPro  5.9  /  Alb  3.5  /  TBili  0.3  /  DBili  x   /  AST  16  /  ALT  14  /  AlkPhos  43  [08-19-19 @ 19:52]    PT/INR: PT 14.7 , INR 1.28       [08-19-19 @ 20:03]  PTT: 30.3       [08-19-19 @ 20:03]      Creatinine Trend:  SCr 10.49 [08-19 @ 19:52]

## 2019-08-20 NOTE — PROVIDER CONTACT NOTE (OTHER) - ACTION/TREATMENT ORDERED:
Team aware, continue w/ hypoglycemia protocol, no interventions for BP at this time, will complete med rec and start home meds. Continue to monitor for acute s/s

## 2019-08-20 NOTE — PROGRESS NOTE ADULT - ASSESSMENT
73M with PMH of CAD with stents (2013, on ASA, plavix), DM, HLD, ESRD on HD now with groin hematoma s/p diagnostic angiogram at outside facility.  CTA performed, no active contrast extravasation or retroperitoneal hematoma.    - Diet as tolerated  - Femstop device applied at 65mmHg- will remove after 2 hours  - Trend serial CBC q 6 hours, serial groin exam   - Continue ASA, hold plavix  - ISS for DM  - HD tomorrow, will consult renal in AM 73M with PMH of CAD with stents (2013, on ASA, plavix), DM, HLD, ESRD on HD now with groin hematoma s/p diagnostic angiogram at outside facility.  CTA performed, no active contrast extravasation or retroperitoneal hematoma.    - Diet as tolerated  - Femstop device applied at 65mmHg- will remove after 2 hours  - Trend serial CBC q 6 hours, serial groin exam   - Continue ASA, hold plavix  - ISS for DM  - HD today- renal consult  - Pain control    Dayana Pham PA-C [id number]

## 2019-08-20 NOTE — CONSULT NOTE ADULT - ATTENDING COMMENTS
Lucius Malagon will be covering for me starting 8/21/19. He can be reached at  if needed.     - Dr. Mckeon ([facility])  - [phone]
ESRD on HD tues and Sat in the chris here for groin hematoma  Plan for HD today   PRBC with HD   Trend h/h

## 2019-08-20 NOTE — PROVIDER CONTACT NOTE (OTHER) - BACKGROUND
Pt admitted w/ hematoma to Left groin s/p angiogram outpatient. Pt is currently NPO for possible procedure/tests. Pt is Type 2 diabetic normally takes glipzide

## 2019-08-21 VITALS
TEMPERATURE: 99 F | DIASTOLIC BLOOD PRESSURE: 65 MMHG | HEART RATE: 90 BPM | SYSTOLIC BLOOD PRESSURE: 158 MMHG | OXYGEN SATURATION: 98 % | WEIGHT: 160.94 LBS | RESPIRATION RATE: 18 BRPM

## 2019-08-21 LAB
ANION GAP SERPL CALC-SCNC: 13 MMOL/L — SIGNIFICANT CHANGE UP (ref 5–17)
APTT BLD: 35.9 SEC — SIGNIFICANT CHANGE UP (ref 27.5–36.3)
BUN SERPL-MCNC: 32 MG/DL — HIGH (ref 7–23)
CALCIUM SERPL-MCNC: 9 MG/DL — SIGNIFICANT CHANGE UP (ref 8.4–10.5)
CHLORIDE SERPL-SCNC: 102 MMOL/L — SIGNIFICANT CHANGE UP (ref 96–108)
CO2 SERPL-SCNC: 28 MMOL/L — SIGNIFICANT CHANGE UP (ref 22–31)
CREAT SERPL-MCNC: 7.24 MG/DL — HIGH (ref 0.5–1.3)
GLUCOSE BLDC GLUCOMTR-MCNC: 106 MG/DL — HIGH (ref 70–99)
GLUCOSE BLDC GLUCOMTR-MCNC: 144 MG/DL — HIGH (ref 70–99)
GLUCOSE SERPL-MCNC: 85 MG/DL — SIGNIFICANT CHANGE UP (ref 70–99)
HCT VFR BLD CALC: 25.7 % — LOW (ref 39–50)
HCT VFR BLD CALC: 25.8 % — LOW (ref 39–50)
HGB BLD-MCNC: 8.7 G/DL — LOW (ref 13–17)
HGB BLD-MCNC: 8.8 G/DL — LOW (ref 13–17)
INR BLD: 1.14 RATIO — SIGNIFICANT CHANGE UP (ref 0.88–1.16)
MAGNESIUM SERPL-MCNC: 2.1 MG/DL — SIGNIFICANT CHANGE UP (ref 1.6–2.6)
MCHC RBC-ENTMCNC: 32 PG — SIGNIFICANT CHANGE UP (ref 27–34)
MCHC RBC-ENTMCNC: 32.1 PG — SIGNIFICANT CHANGE UP (ref 27–34)
MCHC RBC-ENTMCNC: 33.8 GM/DL — SIGNIFICANT CHANGE UP (ref 32–36)
MCHC RBC-ENTMCNC: 34.4 GM/DL — SIGNIFICANT CHANGE UP (ref 32–36)
MCV RBC AUTO: 93.2 FL — SIGNIFICANT CHANGE UP (ref 80–100)
MCV RBC AUTO: 94.4 FL — SIGNIFICANT CHANGE UP (ref 80–100)
PHOSPHATE SERPL-MCNC: 4.5 MG/DL — SIGNIFICANT CHANGE UP (ref 2.5–4.5)
PLATELET # BLD AUTO: 102 K/UL — LOW (ref 150–400)
PLATELET # BLD AUTO: 103 K/UL — LOW (ref 150–400)
POTASSIUM SERPL-MCNC: 4.2 MMOL/L — SIGNIFICANT CHANGE UP (ref 3.5–5.3)
POTASSIUM SERPL-SCNC: 4.2 MMOL/L — SIGNIFICANT CHANGE UP (ref 3.5–5.3)
PROTHROM AB SERPL-ACNC: 13.2 SEC — HIGH (ref 10–12.9)
RBC # BLD: 2.73 M/UL — LOW (ref 4.2–5.8)
RBC # BLD: 2.76 M/UL — LOW (ref 4.2–5.8)
RBC # FLD: 14.6 % — HIGH (ref 10.3–14.5)
RBC # FLD: 14.7 % — HIGH (ref 10.3–14.5)
SODIUM SERPL-SCNC: 143 MMOL/L — SIGNIFICANT CHANGE UP (ref 135–145)
WBC # BLD: 5.8 K/UL — SIGNIFICANT CHANGE UP (ref 3.8–10.5)
WBC # BLD: 6.2 K/UL — SIGNIFICANT CHANGE UP (ref 3.8–10.5)
WBC # FLD AUTO: 5.8 K/UL — SIGNIFICANT CHANGE UP (ref 3.8–10.5)
WBC # FLD AUTO: 6.2 K/UL — SIGNIFICANT CHANGE UP (ref 3.8–10.5)

## 2019-08-21 PROCEDURE — 82330 ASSAY OF CALCIUM: CPT

## 2019-08-21 PROCEDURE — 80048 BASIC METABOLIC PNL TOTAL CA: CPT

## 2019-08-21 PROCEDURE — 85014 HEMATOCRIT: CPT

## 2019-08-21 PROCEDURE — 36430 TRANSFUSION BLD/BLD COMPNT: CPT

## 2019-08-21 PROCEDURE — 84295 ASSAY OF SERUM SODIUM: CPT

## 2019-08-21 PROCEDURE — 85730 THROMBOPLASTIN TIME PARTIAL: CPT

## 2019-08-21 PROCEDURE — 82435 ASSAY OF BLOOD CHLORIDE: CPT

## 2019-08-21 PROCEDURE — 83036 HEMOGLOBIN GLYCOSYLATED A1C: CPT

## 2019-08-21 PROCEDURE — 83735 ASSAY OF MAGNESIUM: CPT

## 2019-08-21 PROCEDURE — 86923 COMPATIBILITY TEST ELECTRIC: CPT

## 2019-08-21 PROCEDURE — 82803 BLOOD GASES ANY COMBINATION: CPT

## 2019-08-21 PROCEDURE — 83605 ASSAY OF LACTIC ACID: CPT

## 2019-08-21 PROCEDURE — 86803 HEPATITIS C AB TEST: CPT

## 2019-08-21 PROCEDURE — 99285 EMERGENCY DEPT VISIT HI MDM: CPT | Mod: 25

## 2019-08-21 PROCEDURE — 85610 PROTHROMBIN TIME: CPT

## 2019-08-21 PROCEDURE — 74174 CTA ABD&PLVS W/CONTRAST: CPT

## 2019-08-21 PROCEDURE — 82962 GLUCOSE BLOOD TEST: CPT

## 2019-08-21 PROCEDURE — P9016: CPT

## 2019-08-21 PROCEDURE — 86901 BLOOD TYPING SEROLOGIC RH(D): CPT

## 2019-08-21 PROCEDURE — 86850 RBC ANTIBODY SCREEN: CPT

## 2019-08-21 PROCEDURE — 80053 COMPREHEN METABOLIC PANEL: CPT

## 2019-08-21 PROCEDURE — 84100 ASSAY OF PHOSPHORUS: CPT

## 2019-08-21 PROCEDURE — 85027 COMPLETE CBC AUTOMATED: CPT

## 2019-08-21 PROCEDURE — 86900 BLOOD TYPING SEROLOGIC ABO: CPT

## 2019-08-21 PROCEDURE — 99261: CPT

## 2019-08-21 PROCEDURE — 84132 ASSAY OF SERUM POTASSIUM: CPT

## 2019-08-21 PROCEDURE — 82947 ASSAY GLUCOSE BLOOD QUANT: CPT

## 2019-08-21 RX ORDER — HYDRALAZINE HCL 50 MG
10 TABLET ORAL ONCE
Refills: 0 | Status: COMPLETED | OUTPATIENT
Start: 2019-08-21 | End: 2019-08-21

## 2019-08-21 RX ORDER — HYDRALAZINE HCL 50 MG
1 TABLET ORAL
Qty: 60 | Refills: 0
Start: 2019-08-21

## 2019-08-21 RX ORDER — ASPIRIN/CALCIUM CARB/MAGNESIUM 324 MG
1 TABLET ORAL
Qty: 0 | Refills: 0 | DISCHARGE

## 2019-08-21 RX ORDER — NIFEDIPINE 30 MG
1 TABLET, EXTENDED RELEASE 24 HR ORAL
Qty: 0 | Refills: 0 | DISCHARGE
Start: 2019-08-21

## 2019-08-21 RX ORDER — HYDRALAZINE HCL 50 MG
1 TABLET ORAL
Qty: 0 | Refills: 0 | DISCHARGE

## 2019-08-21 RX ORDER — ASPIRIN/CALCIUM CARB/MAGNESIUM 324 MG
1 TABLET ORAL
Qty: 0 | Refills: 0 | DISCHARGE
Start: 2019-08-21

## 2019-08-21 RX ORDER — NIFEDIPINE 30 MG
1 TABLET, EXTENDED RELEASE 24 HR ORAL
Qty: 0 | Refills: 0 | DISCHARGE

## 2019-08-21 RX ORDER — HYDRALAZINE HCL 50 MG
50 TABLET ORAL THREE TIMES A DAY
Refills: 0 | Status: DISCONTINUED | OUTPATIENT
Start: 2019-08-21 | End: 2019-08-21

## 2019-08-21 RX ORDER — HYDRALAZINE HCL 50 MG
25 TABLET ORAL ONCE
Refills: 0 | Status: COMPLETED | OUTPATIENT
Start: 2019-08-21 | End: 2019-08-21

## 2019-08-21 RX ADMIN — Medication 50 MILLIGRAM(S): at 13:44

## 2019-08-21 RX ADMIN — Medication 25 MILLIGRAM(S): at 05:03

## 2019-08-21 RX ADMIN — HEPARIN SODIUM 5000 UNIT(S): 5000 INJECTION INTRAVENOUS; SUBCUTANEOUS at 05:04

## 2019-08-21 RX ADMIN — Medication 25 MILLIGRAM(S): at 06:40

## 2019-08-21 RX ADMIN — Medication 81 MILLIGRAM(S): at 12:23

## 2019-08-21 RX ADMIN — Medication 10 MILLIGRAM(S): at 01:35

## 2019-08-21 RX ADMIN — Medication 60 MILLIGRAM(S): at 05:04

## 2019-08-21 RX ADMIN — HEPARIN SODIUM 5000 UNIT(S): 5000 INJECTION INTRAVENOUS; SUBCUTANEOUS at 13:40

## 2019-08-21 RX ADMIN — CHLORHEXIDINE GLUCONATE 1 APPLICATION(S): 213 SOLUTION TOPICAL at 12:25

## 2019-08-21 NOTE — DISCHARGE NOTE PROVIDER - HOSPITAL COURSE
73M with PMH of CAD s/p stents on ASA, plavix, ESRD on HD tu/thurs/sat via [facility] AVF, DM, HLD presented after outpatient IR angiogram with left groin hematoma.          In the ER he was hemodyanmically stable with a HR in the 70s-80s, -160, saturating well on RA.  On exam he had a large L groin hematoma with firm pubis and swollen scrotum.  He had palpable femoral pulses, dopplerable PT bilaterally and nonpalpable bilateral DP.   CTA of abdomen and pelvis revealed no active extravasation or retroperitoneal hematoma.          He was admitted to vascular for observation and serial cbcs.  Following morning of admission, HCT was 20. He was given 2 units of blood with dialysis and responded appropriately.  He remained hemodynamically stable.  He was hypertensive and required increase in hydralazine dosing per medicine consultation.  On day of discharge, the patient was tolerating diet, ambulating well and pain controlled. Groin hematoma was stable and HCT was stable.  He will follow up with Dr. Way/scout and PCP.

## 2019-08-21 NOTE — PROVIDER CONTACT NOTE (OTHER) - ASSESSMENT
Pt A&Ox4 Temp 99.0, HR 84, /64 manual, RR 18, O2 96% on room air. Pt denies headache, blurred vision.

## 2019-08-21 NOTE — DISCHARGE NOTE PROVIDER - NSDCCPCAREPLAN_GEN_ALL_CORE_FT
PRINCIPAL DISCHARGE DIAGNOSIS  Diagnosis: Hematoma  Assessment and Plan of Treatment: stable- follow up with vacscular surgery - please call sooner with worsening swelling in groin area, pain out of proportion, dizziness, syncope or bleeding      SECONDARY DISCHARGE DIAGNOSES  Diagnosis: Hypertension  Assessment and Plan of Treatment: hydralazine increased to 50mg three times a day from 25 mg three times a day- please follow up with your primary care physician for a blood pressure check in 1 week

## 2019-08-21 NOTE — PROGRESS NOTE ADULT - ASSESSMENT
ASSESSMENT:   73M with PMH of CAD with stents (2013, on ASA, plavix), DM, HLD, ESRD on HD presented with groin hematoma s/p diagnostic angiogram, s/p 2 units of pRBCs yesterday. Recieved HD yesterday. Now with stable H/H and serial groin exams.     PLAN:   - Diet as tolerated  - H/H stable, Hb 8.8 from 8.7  - Continue BP management  - Continue ASA, hold plavix  - ISS for DM  - Pain control  - Discharge likely later today     Vascular Surgery   [phone]

## 2019-08-21 NOTE — DISCHARGE NOTE PROVIDER - NSDCFUADDAPPT_GEN_ALL_CORE_FT
Please follow up for your regular schedule dialysis at the [facility] [place]

## 2019-08-21 NOTE — DISCHARGE NOTE NURSING/CASE MANAGEMENT/SOCIAL WORK - NSDCDPATPORTLINK_GEN_ALL_CORE
You can access the [facility] Patient Portal, offered by [facility], by registering with the following website: [email or web address]

## 2019-08-21 NOTE — DISCHARGE NOTE NURSING/CASE MANAGEMENT/SOCIAL WORK - NSDCFUADDAPPT_GEN_ALL_CORE_FT
Please follow up for your regular schedule dialysis at the [facility] [place]

## 2019-08-21 NOTE — DISCHARGE NOTE PROVIDER - CARE PROVIDER_API CALL
[address])  Vascular Surgery  [address] [facility] [address] [place] [id number]  Phone: [phone]  Fax: [phone]  Follow Up Time:     Hector Mckeon ()  Internal Medicine  [address] [place] [address]  Phone: [phone]  Fax: [phone]  Follow Up Time:

## 2019-08-21 NOTE — PROGRESS NOTE ADULT - SUBJECTIVE AND OBJECTIVE BOX
VASCULAR SURGERY PROGRESS NOTE    73yMale    SUBJECTIVE:  Patient seen and examined at bedside. Doing well this morning without complaints. Eager to go home. Denies fevers, chills, nausea, vomiting, chest pain, and shortness of breath.     --------------------------------------------------------------------------------------------------  OBJECTIVE:     Physical Exam:  General: AAOx3, NAD, resting comfortably   HEENT: NC/AT  Cardiac: RRR  Respiratory: no increased work of breathing   Abdomen: soft, nontender, nondistended  Extremities: palpable femoral pulses b/l, groin is soft without evidence of hematoma, minimal swelling unchanged from prior     --------------------------------------------------------------------------------------------------  Vital Signs:  Vital Signs Last 24 Hrs  T(C): 37 (21 Aug 2019 04:55), Max: 37.8 (20 Aug 2019 21:03)  T(F): 98.6 (21 Aug 2019 04:55), Max: 100 (20 Aug 2019 21:03)  HR: 81 (21 Aug 2019 06:14) (69 - 90)  BP: 182/70 (21 Aug 2019 06:14) (160/57 - 194/77)  BP(mean): --  RR: 18 (21 Aug 2019 04:55) (17 - 18)  SpO2: 95% (21 Aug 2019 04:55) (95% - 100%)    --------------------------------------------------------------------------------------------------  Inputs/Outputs:    20 Aug 2019 07:01  -  21 Aug 2019 07:00  --------------------------------------------------------  IN:    Oral Fluid: 690 mL    Other: 1800 mL  Total IN: 2490 mL    OUT:    Other: 3300 mL  Total OUT: 3300 mL    Total NET: -810 mL    --------------------------------------------------------------------------------------------------  Laboratories:                        8.8    5.8   )-----------( 103      ( 21 Aug 2019 05:36 )             25.7     LIVER FUNCTIONS - ( 19 Aug 2019 19:52 )  Alb: 3.5 g/dL / Pro: 5.9 g/dL / ALK PHOS: 43 U/L / ALT: 14 U/L / AST: 16 U/L / GGT: x             21 Aug 2019 05:36    143    |  102    |  32     ----------------------------<  85     4.2     |  28     |  7.24     Ca    9.0        21 Aug 2019 05:36  Phos  4.5       21 Aug 2019 05:36  Mg     2.1       21 Aug 2019 05:36    TPro  5.9    /  Alb  3.5    /  TBili  0.3    /  DBili  x      /  AST  16     /  ALT  14     /  AlkPhos  43     19 Aug 2019 19:52    PT/INR - ( 21 Aug 2019 05:36 )   PT: 13.2 sec;   INR: 1.14 ratio      PTT - ( 21 Aug 2019 05:36 )  PTT:35.9 sec  CAPILLARY BLOOD GLUCOSE    POCT Blood Glucose.: 91 mg/dL (20 Aug 2019 21:46)  POCT Blood Glucose.: 142 mg/dL (20 Aug 2019 16:42)  POCT Blood Glucose.: 146 mg/dL (20 Aug 2019 13:05)  POCT Blood Glucose.: 156 mg/dL (20 Aug 2019 08:56)    --------------------------------------------------------------------------------------------------  Medications:  MEDICATIONS  (STANDING):  aspirin  chewable 81 milliGRAM(s) Oral daily  atorvastatin 80 milliGRAM(s) Oral at bedtime  chlorhexidine 2% Cloths 1 Application(s) Topical daily  dextrose 5%. 1000 milliLiter(s) (50 mL/Hr) IV Continuous <Continuous>  dextrose 50% Injectable 12.5 Gram(s) IV Push once  dextrose 50% Injectable 25 Gram(s) IV Push once  epoetin sonido Injectable 8000 Unit(s) IV Push <User Schedule>  heparin  Injectable 5000 Unit(s) SubCutaneous every 8 hours  hydrALAZINE 50 milliGRAM(s) Oral three times a day  insulin lispro (HumaLOG) corrective regimen sliding scale   SubCutaneous three times a day before meals  insulin lispro (HumaLOG) corrective regimen sliding scale   SubCutaneous at bedtime  NIFEdipine XL 60 milliGRAM(s) Oral daily  tamsulosin 0.4 milliGRAM(s) Oral at bedtime    MEDICATIONS  (PRN):  dextrose 40% Gel 15 Gram(s) Oral once PRN Blood Glucose LESS THAN 70 milliGRAM(s)/deciliter  glucagon  Injectable 1 milliGRAM(s) IntraMuscular once PRN Glucose LESS THAN 70 milligrams/deciliter

## 2021-06-08 NOTE — CONSULT NOTE ADULT - SUBJECTIVE AND OBJECTIVE BOX
takes only as needed).  amLODIPine (NORVASC) 2.5 MG tablet Take 2.5 mg by mouth daily.  Multiple Vitamins-Minerals (THERAPEUTIC MULTIVITAMIN-MINERALS) tablet Take 1 tablet by mouth daily. No current facility-administered medications for this visit. Objective:     He is alert, oriented x 3, pleasant, well nourished, developed and in no   acute distress. Resp 18   Ht 5' 8.5\" (1.74 m)   Wt 140 lb (63.5 kg)   BMI 20.98 kg/m²      Examination of the right knee: Inspection of the skin demonstrates a well-healed midline incision. Inspection of the soft tissues without significant swelling or erythema. The overall alignment of the knee is neutral.  There is minimal intra-articular effusion. AROM     Extension 0     Flexion  115   There no pain associated with ROM testing. Pes anserine bursa non-tender to palpation. Patellar tendon non-tender to palpation. Quadriceps tendon non-tender to palpation. Collateral ligaments non-tender to palpation. Popliteal fossa non-tender to palpation. Retro patellar crepitus is present. There is no instability with varus/valgus stress testing in full extension or 90 degrees of flexion. There is no instability with anterior drawer testing. Extensor Mechanism is intact. Examination of the left knee:   The overall alignment of the knee is varus. Gait is antalgic. There is mild intra-articular effusion. AROM:           PROM:  Extension-         5                   5  Flexion -           115               115  There moderate pain associated with ROM testing. Medial joint line is tender to palpation. Lateral joint line is somewhat tender to palpation. Pes anserine bursa is not tender to palpation. Patellar tendon is not tender to palpation. Quadriceps tendon is not tender to palpation. Collateral ligaments is not tender to palpation. Popliteal fossa is not tender to palpation.   Varus Stress testing negative for pain or crepitus. Valgus Stress testing negative for pain or crepitus. Lachman's test negative for  ACL laxity. Anterior Drawer test negative for ACL laxity. Posterior Drawer test negative for PCL laxity. Sarai's Test negative for meniscus tear. Patellar Compression testing positive for pain or crepitus. Extensor Mechanism is intact. Examination of the lower extremities are intact with sensation to light touch. Motor testing  5/5 in all major motor groups of the lower extremities. Gait is normal heel to toe. Gait is antalgic. SLR negative. Examination of the lower extremities shows intact perfusion to both lower extremities. No cyanosis. Digits are warm to touch, capillary refill is less than 2 seconds. There is no edema noted. The skin to be intact without lacerations or abrasions. No significant erythema. No rashes or skin lesions. X Rays: performed in the office today:   AP Standing, Lateral and Sunrise Right Knee: There is a right cemented total knee arthroplasty present. The alignment is satisfactory. There are no signs of failure or loosening. AP Standing, Lateral and Sunrise views of left knee:   No acute fractures or dislocations noted. Knee x-rays demonstrate osteoarthritis. Medial compartment-    4/4 Kellgren and Mikhail Classification. Lateral compartment-    2/4 Kellgren and Mikhail Classification. PF compartment-          3/4 Kellgren and Mikhail Classification. IM alyssa noted in the distal femur. Broken screw noted. Diagnosis:       ICD-10-CM    1. Status post total right knee replacement  Z96.651 XR KNEE BILATERAL STANDING     XR KNEE RIGHT (1-2 VIEWS)   2. Primary osteoarthritis of left knee  M17.12 XR KNEE BILATERAL STANDING     XR KNEE LEFT (1-2 VIEWS)     ME ARTHROCENTESIS ASPIR&/INJ MAJOR JT/BURSA W/O US     ME TRIAMCINOLONE ACETONIDE INJ        Assessment and Plan:       Assessment:  Stable right knee arthroplasty.   Advanced degenerative arthritis of the left knee. Not interested in surgical option. Currently in palliative care. Plan:  Medications-Tylenol for pain complaints. Procedures- Risk and benefits of corticosteroid intra-articular injection was discussed today. All questions were answered to his satisfaction. He verbally consented to proceed with intra-articular injection today. Cortisone Injection                                                       PROCEDURE NOTE:     Pre op Diagnosis:  left knee pain     Post op Diagnosis: Same  With the [place] [place] permission, his left knee was prepped  in standard sterile fashion with  Alcohol and 2 cc of 0.25% Marcaine and 1 cc of Kenalog 40 mg was injected into the left lateral compartment  without difficulty. The patient tolerated this well without difficulty. A band-aid was applied. The patient was advised to ice the knee for 15-20 minutes to relieve any injection site related pain. Follow up- 3 months. Call or return to clinic if these symptoms worsen or fail to improve as anticipated. Patient is a 73y old  Male who presents with a chief complaint of Femoral artery injury (20 Aug 2019 11:41)    HPI  73M with PMH of CAD s/p stents on ASA, plavix, ESRD on HD tu/thurs/sat via LUE AVF, DM, HLD presents after outpatient IR angiogram with left groin hematoma.  Patient was undergoing a diagnostic angiogram at [facility] with Dr. Watson.  A 7Fr sheath was placed in the L femoral artery.  A closure device was not used as patient was hypertensive at the time.  Immediately post procedure the patient developed a large left groin hematoma over the access site that tracked into his scrotum.  Pressure was applied and it appeared to stop however four hours later the patient developed an expanding hematoma.  He was emergently brought to [facility] for vascular surgery evaluation.      In the ER he was hemodyanmically stable with a HR in the 70s-80s, -160, saturating well on RA.  On exam he had a large L groin hematoma with firm pubis and swollen scrotum.  He had palpable femoral pulses, dopplerable PT bilaterally and nonpalpable bilateral DP.  Dopplerable R AT present.  Hct 24 , lactate 2.1.  CTA of abdomen and pelvis revealed no active extravasation or retroperitoneal hematoma.      PMH/PSH  End-stage renal disease  Diabetes mellitus  CAD (coronary artery disease)  Hyperlipidemia          Home Medications:  aspirin 81 mg oral tablet, chewable: 1 tab(s) orally once a day (19 Aug 2019 21:53)  glipiZIDE 10 mg oral tablet: 1 tab(s) orally once a day (19 Aug 2019 21:53)  Plavix 75 mg oral tablet: 1 tab(s) orally once a day (19 Aug 2019 21:53)      Allergies    No Known Allergies    Intolerances        Social: lives at home with wife     Physical Exam  T(C): 36.9 (08-19-19 @ 20:16), Max: 36.9 (08-19-19 @ 20:16)  HR: 59 (08-19-19 @ 21:42) (59 - 64)  BP: 131/68 (08-19-19 @ 21:42) (111/54 - 158/61)  RR: 16 (08-19-19 @ 21:42) (16 - 18)  SpO2: 99% (08-19-19 @ 21:42) (97% - 100%)  Wt(kg): --  Tmax: T(C): , Max: 36.9 (08-19-19 @ 20:16)  Wt(kg): --              LABS                        8.2    7.7   )-----------( 144      ( 19 Aug 2019 19:52 )             24.4     08-19    142  |  102  |  42<H>  ----------------------------<  145<H>  5.1   |  23  |  10.49<H>    Ca    9.4      19 Aug 2019 19:52    TPro  5.9<L>  /  Alb  3.5  /  TBili  0.3  /  DBili  x   /  AST  16  /  ALT  14  /  AlkPhos  43  08-19    PT/INR - ( 19 Aug 2019 20:03 )   PT: 14.7 sec;   INR: 1.28 ratio         PTT - ( 19 Aug 2019 20:03 )  PTT:30.3 sec      CTA reviewed with radiology. (19 Aug 2019 21:47)      PAST MEDICAL & SURGICAL HISTORY:  End-stage renal disease  Diabetes mellitus  CAD (coronary artery disease)  Hyperlipidemia: cad      FAMILY HISTORY:      SOCIAL HISTORY:    Allergies    No Known Allergies    Intolerances          REVIEW OF SYSEMS:  General: no weakness, no fever/chills, no weight loss/gain, +hematoma, + HTN  Skin/Breast: no rash, no jaundice  Ophthalmologic: no vision changes, no dry eyes   Respiratory and Thorax: no cough, no wheezing, no hemoptysis, no dyspnea  Cardiovascular: no chest pain, no shortness of breath, no orthopnea  Gastrointestinal: no n/v/d, no abdominal pain, no dysphagia   Genitourinary: no dysuria, no frequency, no nocturia, no hematuria, +large L groin hematoma with firm pubis and swollen scrotum  Musculoskeletal: no trauma, no sprain/strain, no myalgias, no arthralgias, no fracture  Neurological: no HA, no dizziness, no weakness, no numbness  Psychiatric: no depression, no SI/HI  Hematology/Lymphatics: no easy bruising  Endocrine: no heat or cold intolerance. no weight gain or loss  Allergic/Immunologic: no allergy or recent reaction       Vital Signs Last 24 Hrs  T(C): 36.8 (20 Aug 2019 16:30), Max: 37.3 (20 Aug 2019 09:00)  T(F): 98.3 (20 Aug 2019 16:30), Max: 99.1 (20 Aug 2019 09:00)  HR: 78 (20 Aug 2019 16:30) (59 - 80)  BP: 194/77 (20 Aug 2019 16:30) (111/54 - 194/77)  BP(mean): --  RR: 18 (20 Aug 2019 16:30) (16 - 18)  SpO2: 98% (20 Aug 2019 16:30) (95% - 100%)  I&O's Summary    19 Aug 2019 07:01  -  20 Aug 2019 07:00  --------------------------------------------------------  IN: 340 mL / OUT: 0 mL / NET: 340 mL    20 Aug 2019 07:01  -  20 Aug 2019 17:04  --------------------------------------------------------  IN: 250 mL / OUT: 0 mL / NET: 250 mL        PHYSICAL EXAM:  GENERAL: NAD, Comfortable  HEAD:  Atraumatic, Normocephalic  EYES: EOMI, PERRLA, conjunctiva and sclera clear  NECK: Supple, No JVD  CHEST/LUNG: Clear to auscultation bilaterally; No wheeze  HEART: Regular rate and rhythm; No murmurs, rubs, or gallops  ABDOMEN: Soft, Nontender, Nondistended; Bowel sounds present  Neuro: AAOx3, no focal deficit, 5/5 b/l extremities  EXTREMITIES:  2+ Peripheral Pulses, No clubbing, cyanosis, or edema  SKIN: No rashes or lesions    LABS:                        9.6    5.9   )-----------( 109      ( 20 Aug 2019 16:55 )             27.5     08-19    142  |  102  |  42<H>  ----------------------------<  145<H>  5.1   |  23  |  10.49<H>    Ca    9.4      19 Aug 2019 19:52    TPro  5.9<L>  /  Alb  3.5  /  TBili  0.3  /  DBili  x   /  AST  16  /  ALT  14  /  AlkPhos  43  08-19    PT/INR - ( 19 Aug 2019 20:03 )   PT: 14.7 sec;   INR: 1.28 ratio         PTT - ( 19 Aug 2019 20:03 )  PTT:30.3 sec  CAPILLARY BLOOD GLUCOSE      POCT Blood Glucose.: 142 mg/dL (20 Aug 2019 16:42)  POCT Blood Glucose.: 146 mg/dL (20 Aug 2019 13:05)  POCT Blood Glucose.: 156 mg/dL (20 Aug 2019 08:56)  POCT Blood Glucose.: 123 mg/dL (20 Aug 2019 06:18)  POCT Blood Glucose.: 68 mg/dL (20 Aug 2019 05:55)  POCT Blood Glucose.: 67 mg/dL (20 Aug 2019 05:54)  POCT Blood Glucose.: 133 mg/dL (20 Aug 2019 00:07)            RADIOLOGY & ADDITIONAL TESTS:    Imaging Personally Reviewed:  [x] YES  [ ] NO    Consultant(s) Notes Reviewed:  [x] YES  [ ] NO      MEDICATIONS  (STANDING):  aspirin  chewable 81 milliGRAM(s) Oral daily  atorvastatin 80 milliGRAM(s) Oral at bedtime  chlorhexidine 2% Cloths 1 Application(s) Topical daily  dextrose 5%. 1000 milliLiter(s) (50 mL/Hr) IV Continuous <Continuous>  dextrose 50% Injectable 12.5 Gram(s) IV Push once  dextrose 50% Injectable 25 Gram(s) IV Push once  epoetin sonido Injectable 8000 Unit(s) IV Push <User Schedule>  heparin  Injectable 5000 Unit(s) SubCutaneous every 8 hours  hydrALAZINE 25 milliGRAM(s) Oral three times a day  insulin lispro (HumaLOG) corrective regimen sliding scale   SubCutaneous three times a day before meals  insulin lispro (HumaLOG) corrective regimen sliding scale   SubCutaneous at bedtime  NIFEdipine XL 60 milliGRAM(s) Oral daily  tamsulosin 0.4 milliGRAM(s) Oral at bedtime    MEDICATIONS  (PRN):  dextrose 40% Gel 15 Gram(s) Oral once PRN Blood Glucose LESS THAN 70 milliGRAM(s)/deciliter  glucagon  Injectable 1 milliGRAM(s) IntraMuscular once PRN Glucose LESS THAN 70 milligrams/deciliter      Care Discussed with Consultants/Other Providers [x] YES  [ ] NO    HEALTH ISSUES - PROBLEM Dx:  Hypertension: Hypertension  Anemia: Anemia  End-stage renal disease: End-stage renal disease Patient is a 73y old  Male who presents with a chief complaint of Femoral artery injury (20 Aug 2019 11:41)    HPI  73M with PMH of CAD s/p stents on ASA, plavix, ESRD on HD tu/thurs/sat via LUE AVF, DM, HLD presents after outpatient IR angiogram with left groin hematoma.  Patient was undergoing a diagnostic angiogram at [facility] with Dr. Watson.  A 7Fr sheath was placed in the L femoral artery.  A closure device was not used as patient was hypertensive at the time.  Immediately post procedure the patient developed a large left groin hematoma over the access site that tracked into his scrotum.  Pressure was applied and it appeared to stop however four hours later the patient developed an expanding hematoma.  He was emergently brought to [facility] for vascular surgery evaluation.      In the ER he was hemodyanmically stable with a HR in the 70s-80s, -160, saturating well on RA.  On exam he had a large L groin hematoma with firm pubis and swollen scrotum.  He had palpable femoral pulses, dopplerable PT bilaterally and nonpalpable bilateral DP.  Dopplerable R AT present.  Hct 24 , lactate 2.1.  CTA of abdomen and pelvis revealed no active extravasation or retroperitoneal hematoma.      Pt seen and examined at bedside. s/p 2 units PRBC. post transfusion hgb stable. Feeling better.  no cp, no sob, no n/v/d.   no melena, no hematochezia. no BRBPR.   no n/v/d. no abdominal pain.   no dysuria, no urinary urgency/frequency. no bowel/bladder incontinence.   the wife at bedside.       PMH/PSH  End-stage renal disease  Diabetes mellitus  CAD (coronary artery disease)  Hyperlipidemia          Home Medications:  aspirin 81 mg oral tablet, chewable: 1 tab(s) orally once a day (19 Aug 2019 21:53)  glipiZIDE 10 mg oral tablet: 1 tab(s) orally once a day (19 Aug 2019 21:53)  Plavix 75 mg oral tablet: 1 tab(s) orally once a day (19 Aug 2019 21:53)  Pt doesn't remember them all.    Allergies    No Known Allergies    Intolerances        Social: lives at home with wife     Physical Exam  T(C): 36.9 (08-19-19 @ 20:16), Max: 36.9 (08-19-19 @ 20:16)  HR: 59 (08-19-19 @ 21:42) (59 - 64)  BP: 131/68 (08-19-19 @ 21:42) (111/54 - 158/61)  RR: 16 (08-19-19 @ 21:42) (16 - 18)  SpO2: 99% (08-19-19 @ 21:42) (97% - 100%)  Wt(kg): --  Tmax: T(C): , Max: 36.9 (08-19-19 @ 20:16)  Wt(kg): --      PHYSICAL EXAM:  GENERAL: NAD, well-developed, comfortable, lying in bed, on room air  HEAD:  Atraumatic, Normocephalic  EYES: EOMI, PERRLA, conjunctiva and sclera clear  NECK: Supple, No JVD  CHEST/LUNG: Clear to auscultation bilaterally; No wheeze  HEART: Regular rate and rhythm; No murmurs, rubs, or gallops  ABDOMEN: Soft, Nontender, Nondistended; Bowel sounds present, +groin/scrotal edema bilaterally   Neuro: AAOx3, no focal weakness, 5/5 b/l extremity strength  EXTREMITIES:  2+ Peripheral Pulses, No clubbing, cyanosis, or edema  SKIN: No rashes or lesions         LABS                        8.2    7.7   )-----------( 144      ( 19 Aug 2019 19:52 )             24.4     08-19    142  |  102  |  42<H>  ----------------------------<  145<H>  5.1   |  23  |  10.49<H>    Ca    9.4      19 Aug 2019 19:52    TPro  5.9<L>  /  Alb  3.5  /  TBili  0.3  /  DBili  x   /  AST  16  /  ALT  14  /  AlkPhos  43  08-19    PT/INR - ( 19 Aug 2019 20:03 )   PT: 14.7 sec;   INR: 1.28 ratio         PTT - ( 19 Aug 2019 20:03 )  PTT:30.3 sec      CTA reviewed with radiology. (19 Aug 2019 21:47)      PAST MEDICAL & SURGICAL HISTORY:  End-stage renal disease  Diabetes mellitus  CAD (coronary artery disease)  Hyperlipidemia: cad      FAMILY HISTORY:      SOCIAL HISTORY:    Allergies    No Known Allergies    Intolerances          REVIEW OF SYSEMS:  General: no weakness, no fever/chills, no weight loss/gain, +hematoma, + HTN  Skin/Breast: no rash, no jaundice  Ophthalmologic: no vision changes, no dry eyes   Respiratory and Thorax: no cough, no wheezing, no hemoptysis, no dyspnea  Cardiovascular: no chest pain, no shortness of breath, no orthopnea  Gastrointestinal: no n/v/d, no abdominal pain, no dysphagia   Genitourinary: no dysuria, no frequency, no nocturia, no hematuria, +large L groin hematoma with firm pubis and swollen scrotum  Musculoskeletal: no trauma, no sprain/strain, no myalgias, no arthralgias, no fracture  Neurological: no HA, no dizziness, no weakness, no numbness  Psychiatric: no depression, no SI/HI  Hematology/Lymphatics: no easy bruising  Endocrine: no heat or cold intolerance. no weight gain or loss  Allergic/Immunologic: no allergy or recent reaction       Vital Signs Last 24 Hrs  T(C): 36.8 (20 Aug 2019 16:30), Max: 37.3 (20 Aug 2019 09:00)  T(F): 98.3 (20 Aug 2019 16:30), Max: 99.1 (20 Aug 2019 09:00)  HR: 78 (20 Aug 2019 16:30) (59 - 80)  BP: 194/77 (20 Aug 2019 16:30) (111/54 - 194/77)  BP(mean): --  RR: 18 (20 Aug 2019 16:30) (16 - 18)  SpO2: 98% (20 Aug 2019 16:30) (95% - 100%)  I&O's Summary    19 Aug 2019 07:01  -  20 Aug 2019 07:00  --------------------------------------------------------  IN: 340 mL / OUT: 0 mL / NET: 340 mL    20 Aug 2019 07:01  -  20 Aug 2019 17:04  --------------------------------------------------------  IN: 250 mL / OUT: 0 mL / NET: 250 mL      LABS:                        9.6    5.9   )-----------( 109      ( 20 Aug 2019 16:55 )             27.5     08-19    142  |  102  |  42<H>  ----------------------------<  145<H>  5.1   |  23  |  10.49<H>    Ca    9.4      19 Aug 2019 19:52    TPro  5.9<L>  /  Alb  3.5  /  TBili  0.3  /  DBili  x   /  AST  16  /  ALT  14  /  AlkPhos  43  08-19    PT/INR - ( 19 Aug 2019 20:03 )   PT: 14.7 sec;   INR: 1.28 ratio         PTT - ( 19 Aug 2019 20:03 )  PTT:30.3 sec  CAPILLARY BLOOD GLUCOSE      POCT Blood Glucose.: 142 mg/dL (20 Aug 2019 16:42)  POCT Blood Glucose.: 146 mg/dL (20 Aug 2019 13:05)  POCT Blood Glucose.: 156 mg/dL (20 Aug 2019 08:56)  POCT Blood Glucose.: 123 mg/dL (20 Aug 2019 06:18)  POCT Blood Glucose.: 68 mg/dL (20 Aug 2019 05:55)  POCT Blood Glucose.: 67 mg/dL (20 Aug 2019 05:54)  POCT Blood Glucose.: 133 mg/dL (20 Aug 2019 00:07)            RADIOLOGY & ADDITIONAL TESTS:    Imaging Personally Reviewed:  [x] YES  [ ] NO    Consultant(s) Notes Reviewed:  [x] YES  [ ] NO      MEDICATIONS  (STANDING):  aspirin  chewable 81 milliGRAM(s) Oral daily  atorvastatin 80 milliGRAM(s) Oral at bedtime  chlorhexidine 2% Cloths 1 Application(s) Topical daily  dextrose 5%. 1000 milliLiter(s) (50 mL/Hr) IV Continuous <Continuous>  dextrose 50% Injectable 12.5 Gram(s) IV Push once  dextrose 50% Injectable 25 Gram(s) IV Push once  epoetin sonido Injectable 8000 Unit(s) IV Push <User Schedule>  heparin  Injectable 5000 Unit(s) SubCutaneous every 8 hours  hydrALAZINE 25 milliGRAM(s) Oral three times a day  insulin lispro (HumaLOG) corrective regimen sliding scale   SubCutaneous three times a day before meals  insulin lispro (HumaLOG) corrective regimen sliding scale   SubCutaneous at bedtime  NIFEdipine XL 60 milliGRAM(s) Oral daily  tamsulosin 0.4 milliGRAM(s) Oral at bedtime    MEDICATIONS  (PRN):  dextrose 40% Gel 15 Gram(s) Oral once PRN Blood Glucose LESS THAN 70 milliGRAM(s)/deciliter  glucagon  Injectable 1 milliGRAM(s) IntraMuscular once PRN Glucose LESS THAN 70 milligrams/deciliter      Care Discussed with Consultants/Other Providers [x] YES  [ ] NO    HEALTH ISSUES - PROBLEM Dx:  Hypertension: Hypertension  Anemia: Anemia  End-stage renal disease: End-stage renal disease

## 2022-03-02 NOTE — ED ADULT NURSE NOTE - PRO INTERPRETER NEED 2
2022       Carlos A Capps, DO  [address]  [address]  [facility] [address]  Via In Basket      Patient: Mariya Tobin   YOB: 1988   Date of Visit: 3/2/2022       Dear Dr. Capps:    Thank you for referring Mariya Tobin to me for evaluation. Below are my notes for this visit with her.    If you have questions, please do not hesitate to call me. I look forward to following your patient along with you.      Sincerely,        Mika Rothman MD        CC: No Recipients  Mika Rothman MD  3/2/2022  7:17 PM  Signed  MATERNAL FETAL MEDICINE CONSULT     Mariya Tobin     3/2/2022    REFERRING PROVIDER: Dr. Carlos A Capps     Mariya is a 33 year old   with intrauterine pregnancy at 32w2d who presents today for an MFM consult due to fetal growth restriction.  The patient is also a gestational diabetic on insulin 20 units of Humulin and at bedtime and Metformin.  The patient has also had proteinuria and tells me that she was diagnosed with preeclampsia recently.  Her blood pressure today is 120/70.  She has no significant edema however she was not able to give a urine sample.    HPI/CURRENT PREGNANCY: This pregnancy is complicated by as outlined above fetal growth restriction.  The patient's last ultrasound showed the fetus to be at the 7th percentile however on today's ultrasound the fetus is less than the third and probably less than the 1st percentile.  Fortunately there is a normal SARAI and normal Doppler flow studies of the umbilical artery.  Fetal movement is regular and her biophysical score was 10 out of 10 total.    I have reviewed Mariya's previous OB ultrasound reports, pertinent prenatal labwork and testing provided by her referring OB provider.     No current facility-administered medications for this visit.     No current outpatient medications on file.     Facility-Administered Medications Ordered in Other Visits   Medication Dose Route Frequency Provider Last  Rate Last Admin   • betamethasone acet/sod phos (CELESTONE) injection 12 mg  12 mg Intramuscular Q24H Carlos A TRAM Jefry, DO   12 mg at 22 4519      ?   OB History    Para Term  AB Living   1 0 0 0 0 0   SAB IAB Ectopic Molar Multiple Live Births   0 0 0 0 0 0      # Outcome Date GA Lbr Nitesh/2nd Weight Sex Delivery Anes PTL Lv   1 Current             ?   ?   Review of patient's allergies indicates:   ALLERGIES:   Allergen Reactions   • Penicillins Other (See Comments)     Tolerates cephalosporin medication        Past Medical History:   Diagnosis Date   • Fatty liver    • Hydradenitis    • Impaired fasting glucose    • Metabolic syndrome    • PCOS (polycystic ovarian syndrome)    • Subclinical hypothyroidism        Past Surgical History:   Procedure Laterality Date   • [place] tooth extraction         Family History   Problem Relation Age of Onset   • Diabetes Mother    • Hypertension Father    • Diabetes Father    • Hyperlipidemia Brother    • Patient is unaware of any medical problems Brother       ?   Social History     Tobacco Use   • Smoking status: Never Smoker   • Smokeless tobacco: Never Used   Vaping Use   • Vaping Use: never used   Substance Use Topics   • Alcohol use: Not Currently     Alcohol/week: 0.0 - 1.0 standard drinks     Comment: Holidays   • Drug use: Never    ?   ?   REVIEW OF SYSTEMS:   Headaches: None   nausea/vomiting:  None   reports fetal movement: Yes  abdominal pain/tenderness/cramping/contractions: none  vaginal bleeding: none  vaginal leaking of fluid: none    leg pain/tenderness: none  all other systems negative unless noted in the HPI    PHYSICAL EXAM:   Visit Vitals  /70 (BP Location: RUE - Right upper extremity, Patient Position: Sitting, Cuff Size: Regular)   Wt 66.2 kg (145 lb 15.1 oz)   LMP 2021 (Approximate)   BMI 26.69 kg/m²       General: A&Ox3, NAD, pleasant   Lungs: non-labored breathing  Abdomen: soft, nontender,  distended by gravid uterus, no  abnormal masses   Extremities: no c/c/e bilateral LE  Skin: no rashes  Pelvic Exam: deferred     ULTRASOUND RESULTS:  Ultrasound today shows a chen intrauterine pregnancy with an estimated fetal weight of 1396 g.  This is approximately a 220 g weight gain over the last 3 weeks.  The fetal abdomen is less than the 1st percentile with all other measurements between the fourth and 10th percentile with the exception of the cerebellum which is at the 28th percentile.  Deepest pocket of fluid is normal at 4.8 cm.  Doppler flow studies of the umbilical artery are normal with an average SD ratio at 3.06.  There are no instances of absent or reversed end-diastolic flow.  The fetus is in the cephalic presentation.  Sonographic score for biophysical profile is 8/8.  The NST was reactive for a total biophysical profile score of 10 out of 10.    I reviewed the results of the ultrasound examination with the patient in detail.     DISCUSSION/RECOMMENDATIONS:     Encounter Diagnoses   Name Primary?   • Insulin controlled gestational diabetes mellitus (GDM) in third trimester Yes   • Poor fetal growth affecting management of mother in third trimester, single or unspecified fetus         Discussion:  The patient and I reviewed the findings of her ultrasound and she is aware that there has been minimal amounts of growth in the past 3 weeks although I reassured her that it is always good to see that there has been some growth.  I also advised her of the reassuring findings with a normal SARAI and the biophysical score sonographically of 8/8.    The patient did undergo a nonstress test was reactive with a fetal heart rate baseline of 145.  There were no decelerations.    I reviewed the issue with the fetal growth restriction with the patient.  We discussed that this may related to longstanding indolent preeclampsia which may have affected the function of the placenta.  I advised her that we would be testing her very closely with  twice weekly NSTs along with weekly SARAI's and Dopplers to ensure fetal wellbeing.  We also reviewed the importance of 3 times daily kick counts along with when to contact your office so that she can get follow-up instructions.  I advised her if there are concerns over fetal kick counts and she cannot reach your office she should go directly to labor and delivery for evaluation.    The patient is aware that she will likely need to be delivered prior to being term however we do our best to get her as close to term as possible.  I also advised her that since it is possible that she will have nonreassuring fetal testing develop I would like to get her started on betamethasone treatment so that we can get a course of  steroids completed so that there is no need to delay delivery if the problem should arise.  The patient was initially scheduled to get this done at [facility] however she after contacting your office did prefer to get the medication given at [facility] so that she can get the second dose there tomorrow also.    For the immediate testing we discussed that I would like her to get an NST in your office on Friday and then she can complete her regularly scheduled NST in your office next Tuesday.  We would then have her come to maternal-fetal medicine for weekly NST SARAI and Dopplers on  so that we have a balance of the testing throughout the week on  and .  These will be scheduled prior to the patient leaving.    The patient should continue her current doses of insulin and Metformin as she has good control of her glucose levels on this.  I also advised her that it is important that she continue her diet as she is taking it at this time.    Recommendations:  1.  The patient will be getting her first dose of betamethasone today and the second tomorrow at [facility] and certainly they may be contacting you for these orders.    2.  3 times daily kick counts were reviewed with the  English patient along with when she should contact you for further instructions if criteria is not met.  She is to go directly to labor and delivery if she cannot contact you in short order.    3.  Twice-weekly testing for fetal wellbeing is recommended with NSTs on Tuesdays in your office and an NST biophysical and fluid volume in maternal-fetal medicine along with a follow-up evaluation every Friday.  These will be scheduled for the next 3 to 4 weeks.    4.  An interval growth scan will be scheduled in 2 weeks time at that point to ensure that there is at least some positive growth to determine if the pregnancy can be allowed to continue on.  The patient is aware that at any visit she may be advised that testing is not reassuring and that delivery is necessary.    5.  Delivery may be necessary prior to 37 weeks gestation however we will continue following testing and growth to make that final determination.  Given that the fetal AC is less than the 1st percentile as is the overall size of the fetus I do not feel that this is a patient who should be pushed to get to term if the risks are too great.    6.  Preeclamptic precautions were reviewed in detail with the patient given that she was previously given this diagnosis.  She is aware of any symptoms that were reviewed should occur she should contact you for further evaluation.    7.  The patient's questions were all answered.      Thank you for the opportunity to assist with Mariya's obstetrical care. Please do not hesitate to contact me if you have any questions.    Total visit length: 50 minutes, with 35 minutes time face to face with the patient and 15 minutes time spent same day for chart review and documentation.     A letter regarding this visit has been sent to the referring provider.      Mika Rothman MD  3/2/2022  7:00 PM

## 2022-05-10 NOTE — ED ADULT NURSE NOTE - TEMPLATE
HPI:  5/10/22, Time: 12:00 AM EDT         Stephanie Mcintosh is a 45 y.o. male presenting to the ED for left-sided chest pain, beginning 2 days ago. The complaint has been persistent, moderate in severity, and worsened by nothing. Patient reporting left-sided chest pain that started several days ago its been constant for the past day. Patient reporting some mild cough he states he goes into his left arm. Patient reporting no abdominal pain he reports no vomiting or diarrhea reports no syncopal event. Patient reporting no fever no chills he reports some cough. He reports no weakness or dizziness or syncopal event. Patient denying any cocaine use but does admit to still smoking marijuana. Patient reporting no leg pain or swelling. ROS:   Pertinent positives and negatives are stated within HPI, all other systems reviewed and are negative.  --------------------------------------------- PAST HISTORY ---------------------------------------------  Past Medical History:  has a past medical history of ETOH abuse, GERD (gastroesophageal reflux disease), HLD (hyperlipidemia), HTN (hypertension), Polysubstance abuse ([facility] [address] and Tobacco abuse. Past Surgical History:  has no past surgical history on file. Social History:  reports that he has been smoking cigarettes. He has been smoking about 1.00 pack per day. He has never used smokeless tobacco. He reports current alcohol use. He reports current drug use. Drugs: IV, Cocaine, and Marijuana ([facility]). Family History: family history is not on file. The patients home medications have been reviewed. Allergies: Patient has no known allergies.     ---------------------------------------------------PHYSICAL EXAM--------------------------------------    Constitutional/General: Alert and oriented x3, well appearing, non toxic in NAD  Head: Normocephalic and atraumatic  Eyes: PERRL, EOMI  Mouth: Oropharynx clear, handling secretions, no trismus  Neck: Supple, full ROM, non tender to palpation in the midline, no stridor, no crepitus, no meningeal signs  Pulmonary: Lungs clear to auscultation bilaterally, no wheezes, rales, or rhonchi. Not in respiratory distress  Cardiovascular:  Regular rate. Regular rhythm. No murmurs, gallops, or rubs. 2+ distal pulses  Chest: no chest wall tenderness  Abdomen: Soft. Non tender. Non distended. +BS. No rebound, guarding, or rigidity. No pulsatile masses appreciated. Musculoskeletal: Moves all extremities x 4. Warm and well perfused, no clubbing, cyanosis, or edema. Capillary refill <3 seconds  Skin: warm and dry. No rashes. Neurologic: GCS 15, CN 2-12 grossly intact, no focal deficits, symmetric strength 5/5 in the upper and lower extremities bilaterally  Psych: Normal Affect    -------------------------------------------------- RESULTS -------------------------------------------------  I have personally reviewed all laboratory and imaging results for this patient. Results are listed below.      LABS:  Results for orders placed or performed during the hospital encounter of 05/09/22   CBC with Auto Differential   Result Value Ref Range    WBC 7.6 4.5 - 11.5 E9/L    RBC 4.36 3.80 - 5.80 E12/L    Hemoglobin 14.0 12.5 - 16.5 g/dL    Hematocrit 41.9 37.0 - 54.0 %    MCV 96.1 80.0 - 99.9 fL    MCH 32.1 26.0 - 35.0 pg    MCHC 33.4 32.0 - 34.5 %    RDW 13.0 11.5 - 15.0 fL    Platelets 950 432 - 420 E9/L    MPV 11.4 7.0 - 12.0 fL    Neutrophils % 54.5 43.0 - 80.0 %    Immature Granulocytes % 0.4 0.0 - 5.0 %    Lymphocytes % 33.7 20.0 - 42.0 %    Monocytes % 10.4 2.0 - 12.0 %    Eosinophils % 0.5 0.0 - 6.0 %    Basophils % 0.5 0.0 - 2.0 %    Neutrophils Absolute 4.14 1.80 - 7.30 E9/L    Immature Granulocytes # 0.03 E9/L    Lymphocytes Absolute 2.56 1.50 - 4.00 E9/L    Monocytes Absolute 0.79 0.10 - 0.95 E9/L    Eosinophils Absolute 0.04 (L) 0.05 - 0.50 E9/L    Basophils Absolute 0.04 0.00 - 0.20 E9/L   Comprehensive Metabolic Panel   Result Value Ref Range    Sodium 134 132 - 146 mmol/L    Potassium 3.2 (L) 3.5 - 5.0 mmol/L    Chloride 94 (L) 98 - 107 mmol/L    CO2 28 22 - 29 mmol/L    Anion Gap 12 7 - 16 mmol/L    Glucose 97 74 - 99 mg/dL    BUN 7 6 - 20 mg/dL    CREATININE 1.1 0.7 - 1.2 mg/dL    GFR Non-African American >60 >=60 mL/min/1.73    GFR African American >60     Calcium 9.3 8.6 - 10.2 mg/dL    Total Protein 7.3 6.4 - 8.3 g/dL    Albumin 4.6 3.5 - 5.2 g/dL    Total Bilirubin 0.3 0.0 - 1.2 mg/dL    Alkaline Phosphatase 70 40 - 129 U/L    ALT 29 0 - 40 U/L    AST 32 0 - 39 U/L   Troponin   Result Value Ref Range    Troponin, High Sensitivity 10 0 - 11 ng/L   URINE DRUG SCREEN   Result Value Ref Range    Amphetamine Screen, Urine POSITIVE (A) Negative <1000 ng/mL    Barbiturate Screen, Ur NOT DETECTED Negative < 200 ng/mL    Benzodiazepine Screen, Urine NOT DETECTED Negative < 200 ng/mL    Cannabinoid Scrn, Ur POSITIVE (A) Negative < 50ng/mL    Cocaine Metabolite Screen, Urine NOT DETECTED Negative < 300 ng/mL    Opiate Scrn, Ur NOT DETECTED Negative < 300ng/mL    PCP Screen, Urine NOT DETECTED Negative < 25 ng/mL    Methadone Screen, Urine NOT DETECTED Negative <300 ng/mL    Oxycodone Urine NOT DETECTED Negative <100 ng/mL    FENTANYL SCREEN, URINE NOT DETECTED Negative <1 ng/mL    Drug Screen Comment: see below    D-Dimer, Quantitative   Result Value Ref Range    D-Dimer, Quant <200 ng/mL DDU   EKG 12 Lead   Result Value Ref Range    Ventricular Rate 85 BPM    Atrial Rate 85 BPM    P-R Interval 132 ms    QRS Duration 86 ms    Q-T Interval 346 ms    QTc Calculation (Bazett) 411 ms    P Axis 45 degrees    R Axis 87 degrees    T Axis 63 degrees       RADIOLOGY:  Interpreted by Radiologist.  XR CHEST PORTABLE   Final Result   No acute findings. EKG:  This EKG is signed and interpreted by me.     Rate: 85  Rhythm: Sinus  Interpretation: no acute changes  Comparison: stable as compared to patient's most recent EKG        ------------------------- NURSING NOTES AND VITALS REVIEWED ---------------------------   The nursing notes within the ED encounter and vital signs as below have been reviewed by myself. BP (!) 145/92   Pulse 83   Temp 98.5 °F (36.9 °C) (Oral)   Resp 16   Ht 5' 7\" (1.702 m)   Wt 175 lb (79.4 kg)   SpO2 97%   BMI 27.41 kg/m²   Oxygen Saturation Interpretation: Normal    The patients available past medical records and past encounters were reviewed. ------------------------------ ED COURSE/MEDICAL DECISION MAKING----------------------  Medications - No data to display          Medical Decision Making:    Presenting here because of ongoing chest pain for last 2 days worsening over the past day. Patient reports the pain does go into his arm. Patient's labs and EKG noted reviewed. Patient was seen on arrival by EMS. Patient was sent to waiting room. Patient's labs were noted reviewed. Patient reportedly eloped from the waiting room. Patient was nowhere to be found. Re-Evaluations:  Eloped from the waiting room. Consultations:                 Critical Care: This patient's ED course included: a personal history and physicial eaxmination    This patient has been closely monitored during their ED course. Counseling: The emergency provider has spoken with the patient and discussed todays results, in addition to providing specific details for the plan of care and counseling regarding the diagnosis and prognosis. Questions are answered at this time and they are agreeable with the plan.       --------------------------------- IMPRESSION AND DISPOSITION ---------------------------------    IMPRESSION  1. Chest pain, unspecified type        DISPOSITION  Disposition: Patient eloped from the waiting room. NOTE: This report was transcribed using voice recognition software.  Every effort was made to ensure accuracy; however, inadvertent computerized transcription errors may be present          Graeme Sanchez MD  05/10/22 0001       Graeme Sanchez MD  05/10/22 [id number] General

## 2023-07-12 NOTE — PATIENT PROFILE ADULT - SURGICAL SITE INCISION
[place] [id number] regarding patients request to have update on plan of care and also to address patient pain. neuro team stated they would put in order for pain medication and will contact the family.
patient taken to [place] via transporter
pharmacy called for medications
Respirations spontaneous and unlabored, neuro sensory intact.  patient is states he still has pain 9/10 provider notified.
yes
Report Received from HENOK Montes De [facility]ca. met patient at bedside Respirations spontaneous and unlabored. Skin intact, warm, dry, normal for race. neuro assessment intact. patient endorses pain to left shoulder and no improvement after morphine administration. patients wife at bedside stated patient has not taken Keppra dose for this morning. Provider notified.

## 2023-09-14 NOTE — PATIENT PROFILE ADULT - FAMILY NEEDS
Herberth Coley  [address] [facility] [id number]    Dr. Jose Yousif  [address] [place] [address]  ** Report to entrance 6 on the 1st Floor, [facility]    Date of Procedure: Thursday, April 23, 2024  Check in at: 5:45 AM   Procedure at: 7:15 AM    Please call the GI clinic at [phone] with any questions.  If you need to cancel or reschedule your procedure for any reason, please contact us at least  3 days prior to your procedure.    Your laxative prescription Nulytely  may be picked up from:  [facility]  At the time your procedure is scheduled the prescription has been sent to your pharmacy of choice. If your prescription is not picked up within 1 week, the prescription will be placed on file at the pharmacy.  Please ask the pharmacy to prepare medication that is on file prior to calling the clinic.    Colonoscopy Preparation Instructions    Please follow these instructions. Disregard instructions that are provided on the medication package.    **Please make arrangements for a responsible adult to drive you home. (A responsible adult is someone age 18 or older who can receive and understand instructions, stay with you, and call for assistance as instructed).**                                   Regarding Your Medications Prior to Your Procedure:    If these medication instructions are not followed, your procedure may need to be canceled for your own safety.     IF YOU TAKE ANY OF THESE MEDICATIONS YOU MUST HOLD THEM FOR A FULL 7 DAYS PRIOR TO THE PROCEDURE:  semaglutide (Ozempic, Wegovy, Rybelsus), dulaglutide (Trulicity), liraglutide (Victoza), exanatide (Byetta), or tirzepatide (Mounjaro).  If you take the medication Phentermine (Adipex-P or Lomaira), do not take it for 7 days prior to your procedure.  Blood-thinners such as Coumadin (warfarin), Pradaxa, Plavix, Eliquis, Xarelto or Brilinta typically need to be stopped a few days prior to the procedure. Please inform the  GI clinic if you are taking a blood-thinner and we will obtain approval to hold it prior to your procedure. If you did not receive approval to hold your blood thinner and you continue taking it, the procedure may need to be canceled.  You will need to hold your ELIQUIS prior to procedure. We will contact you closer to procedure date with specific holding instructions. If you are not aware of how long you need to hold your ELIQUIS by 4/1/2024, follow up with GI clinic.    Do not take aspirin or aspirin-containing products for 7 days prior to the procedure if used for pain relief or preventative measures. If you have coronary artery disease, recent stent placement, or history of stroke then you can continue aspirin and only hold it the day of the procedure.  Do not take meloxicam and naproxen (Aleve) for 5 days prior to your procedure.  Celecoxib (Celebrex) should be held for 5 days prior to the procedure.  Do not take short acting anti-inflammatory medications such as ibuprofen (Advil, Motrin), diclofenac, or ketorolac for 5 days prior to your procedure.    Do not take naltrexone for 3 days prior to your procedure.  Do not take cannabidiol (CBD) products for 3 days prior to the procedure. Do not smoke marijuana for 2 weeks prior to the procedure.  Do not take sildenafil or tadalafil for 3 days prior to the procedure unless it is used for pulmonary hypertension.  If you take diabetic medication:  Please contact the GI clinic if you begin taking any new diabetic medications or if you take insulin and instructions are not listed.  Do not take your lisinopril for 24 hours prior to your procedure.  Supplement Instructions:  Do not take any iron or iron-containing multivitamins beginning five days prior to your procedure.    Do not take chondroitin and glucosamine for 48 hours prior to the procedure.  Do not take multivitamins and vitamin D the day of the procedure.                  The Day Before the Procedure:    Start a  clear liquid diet at breakfast. Continue a clear liquid diet for the entire day in unlimited amounts. Clear liquids are listed below as follows:    Clear Liquid Diet:  Do not consume anything red or purple.  Beverages: soft drinks/soda, Gatorade or Kali-Aid, clear fruit juices without pulp, water, tea, coffee (no milk or non dairy creamer).  Broths: chicken, beef or vegetable.  Desserts: hard candies, Jell-O, Popsicles. (No fruit bars or sherbet)     In the morning, add tap water to the laxative container, shake well and place in the refrigerator.   Lemonade flavor Crystal Lite mix, obtained at the grocery store, can also be used to improve the flavor. Once water is added, the solution is only good for 48 hours.   At approximately 4:00 PM, (or when you are home for the evening), begin drinking the Nulytely solution. Drink 8 oz. every 20 minutes until you have consumed half of the gallon of the solution.   At 8:00 PM begin drinking the remaining Nulytely solution.  Drink 8 oz. every 20 minutes until you consumed the remainder of the solution.  Walking will help the laxative move through the colon.     The Day of the Procedure:    You may take your morning medications with a sip of water, unless otherwise directed.    You may continue clear liquids until 4 hours before the procedure start time.  Do not have anything by mouth after 3:15 AM.   If stools are not clear yellow the morning of the procedure, please call the GI lab at [phone].                                              About Your Colonoscopy  What is colonoscopy?    Colonoscopy is a procedure that allows your doctor to clearly see the lining of your colon (large bowel). A flexible tube, about the thickness of your finger, is put into the rectum and moved slowly through the entire colon. A special camera in the tube allows the doctor to see any problem areas in the colon.    Why is a colonoscopy needed?    A colonoscopy can be done:     1.   As a  screening test for colon cancer. The American Cancer Society recommends colon screening for every adult starting at age 50, sometimes earlier, if you have a family history of colon cancer or polyps.   Ask your doctor when you should be screened.     2.  To find out what is causing symptoms such as rectal bleeding or changes in bowel habits (X-rays alone may not find the problem).    3.  As a regular follow-up exam for patients with previous polyps, colon cancer, or a family history of colon cancer.     How do I prepare for the colonoscopy?    For the best possible exam, the colon must be completely empty. Your doctor will give you detailed instructions for a cleansing routine and diet to follow. Or, you will be told what time to arrive at the hospital to begin the cleansing routine before your colonoscopy.    Can I take my medicines?    Most medicines can be taken as usual, but some can interfere with the preparation or the exam. Please talk with your doctor at least a week before the exam. Ask about taking your medicines, especially if you take aspirin products, anticoagulants (blood thinners), arthritis or blood pressure medicines, insulin or iron products.    What happens during the colonoscopy?    Your doctor will give you medicine through a vein in your arm to help you relax and stay comfortable during the exam. You will lie on your side or on your back while the flexible tube is moved slowly through the large bowel. As the tube is slowly withdrawn, the doctor looks at the lining of the large bowel. You may feel some cramping or gas but the medicine should keep you comfortable. The exam usually takes about 20 to 30 minutes.          What is a polypectomy?    Sometimes polyps are found during a colonoscopy.     Polyps are abnormal growths of tissue found on the colon lining. If your doctor thinks a polyp should be removed, a small wire loop or snare will be passed through the tube and the polyp will be cut out.   You will not feel this. Most polyps are benign (not cancer). But some may contain an area of cancer or may develop into cancer.     Removal of colon polyps is an important way to prevent colon cancer. People who have had a history of polyps may need to have follow-up colonoscopies to check if new polyps have formed. These follow-up exams usually occur in one to five years of your first exam (your doctor will tell you when you need to schedule another exam). Some people who have large polyps or cancerous polyps may need to have surgery. Your doctor will educate and prepare you for this step, if needed.    What happens after the colonoscopy?     Your doctor will explain the results to you.    You may have some cramping or bloating because of the air put into the colon during the exam. This should go away quickly with passage of gas.    Generally, you should be able to eat and drink as usual after the exam.     If you were given medicines to help you relax during the exam, someone must take you home.  For your own safety, please have a friend or family member drive you. If you do not have a ride home, your procedure may need to be rescheduled.    Going home      You should not drive or operate any machinery for 24 hours. Even if you feel alert, your judgment and reflexes may be slower from the sedative medicine.    Do not make legally binding decisions for the next 24 hours.    Do not drink alcohol for the next 24 hours.      Are there complications of colonoscopy?    Complications after a colonoscopy are rare, but can occur.                               What are the risks of colonoscopy?    While this is a relatively safe and routine procedure, there are risks associated with it.  The average risk of potential complications is reported to be less than 1%.  Please note this percent is NOT zero.  Complications can and do occur.    These can include, but are not limited to:    Perforation:  A tear or hole in the colon.   Average risk is generally less than 0.1%.  Emergency surgery may be required to repair this.  Bleeding after the removal of a polyp can occur, generally less than 0.5% risk.  This will often stop on its own, but may require blood transfusion, repeat colonoscopy, or surgery.  Risk of infection or injury to internal organs is extremely low.  Complications from the sedation, which can include (and are not limited to): breathing or blood pressure problems, pneumonia infection, heart problems or heart attack, stroke, etc.  While there are risks as mentioned above, having a colonoscopy to identify and remove polyps can prevent up to 90% of colorectal cancers.    When should I call the doctor?    Although complications after colonoscopy are not common, it is important to know the early signs of any possible complication. Call the doctor who performed your colonoscopy if you notice:     Severe abdominal pain   Fever and chills   Bloody bowel movement; bleeding can occur several days after polyp removal   Distended and hard abdomen   If you have any questions or concerns    Need more Information?    Please talk with your doctor or the office staff if you have questions about the colonoscopy.  For any questions regarding cost, billing and insurance coverage, please call [phone].  If you have questions that have not been answered, please discuss them with the nurse or doctor before the exam begins.                                  INSURANCE COVERAGE REGARDING PAYMENT  FOR YOUR COLONOSCOPY    **To obtain a pre-service estimate of your procedure - call [phone] to reach the **    Colon Cancer is the second leading cause of death among cancers, per the American Cancer Society.  It is preventable.  Early detection is the key.  Your doctor will determine which tests need to be done for prevention and/or treatment.    If during the course of a screening colonoscopy, our physician finds an abnormality,  performs a biopsy or polypectomy (removal of polyp), your insurance company most likely will consider the procedure to be a diagnostic exam and no longer a screening procedure.    Every insurance company is different.  We encourage you to call your insurance company and ask them \"if during the course of a screening colonoscopy, an abnormality is discovered and the physician performs a biopsy or polypectomy, will the procedure fall under your screening benefits or under diagnostic benefits\".  Generally, screening benefits and diagnostic benefits are paid at different levels.  This varies with each insurance company, so we want you to be aware of this prior to your procedure.  You do not have to call your insurance company if you have Medicare.    The authorization staff at [facility] will precertify your colonoscopy.  However, precertification, which serves as a notification is never a guarantee of payment.  If you have questions regarding precertification for your procedure please contact your insurance company.                                             Dr. Jose Yousif    9/14/2023    Herberth Coley  [address] [facility] [address]                    Dear Mr. Coley    Please review the enclosed information.    Thank you.       no

## 2025-01-12 NOTE — ED PROVIDER NOTE - [facility]
actual/standing
CAD (coronary artery disease)    Diabetes mellitus    End-stage renal disease    Hyperlipidemia  cad